# Patient Record
Sex: MALE | Race: WHITE | Employment: OTHER | URBAN - METROPOLITAN AREA
[De-identification: names, ages, dates, MRNs, and addresses within clinical notes are randomized per-mention and may not be internally consistent; named-entity substitution may affect disease eponyms.]

---

## 2017-02-04 ENCOUNTER — HOSPITAL ENCOUNTER (EMERGENCY)
Age: 72
Discharge: HOME | End: 2017-02-04
Attending: EMERGENCY MEDICINE
Payer: MEDICARE

## 2017-02-04 VITALS
DIASTOLIC BLOOD PRESSURE: 73 MMHG | HEART RATE: 59 BPM | OXYGEN SATURATION: 100 % | TEMPERATURE: 97.9 F | SYSTOLIC BLOOD PRESSURE: 135 MMHG | RESPIRATION RATE: 16 BRPM

## 2017-02-04 DIAGNOSIS — R33.9 URINARY RETENTION: Primary | ICD-10-CM

## 2017-02-04 LAB
APPEARANCE UR: ABNORMAL
BACTERIA URNS QL MICRO: NEGATIVE /HPF
BILIRUB UR QL: ABNORMAL
COLOR UR: ABNORMAL
EPITH CASTS URNS QL MICRO: NEGATIVE /LPF (ref 0–5)
GLUCOSE UR STRIP.AUTO-MCNC: 100 MG/DL
HGB UR QL STRIP: ABNORMAL
KETONES UR QL STRIP.AUTO: NEGATIVE MG/DL
LEUKOCYTE ESTERASE UR QL STRIP.AUTO: ABNORMAL
NITRITE UR QL STRIP.AUTO: POSITIVE
PH UR STRIP: 6.5 [PH] (ref 5–8)
PROT UR STRIP-MCNC: 100 MG/DL
RBC #/AREA URNS HPF: NORMAL /HPF (ref 0–5)
SP GR UR REFRACTOMETRY: 1.01 (ref 1–1.03)
UROBILINOGEN UR QL STRIP.AUTO: 0.2 EU/DL (ref 0.2–1)
WBC URNS QL MICRO: NORMAL /HPF (ref 0–4)

## 2017-02-04 PROCEDURE — 99283 EMERGENCY DEPT VISIT LOW MDM: CPT

## 2017-02-04 PROCEDURE — 81001 URINALYSIS AUTO W/SCOPE: CPT | Performed by: EMERGENCY MEDICINE

## 2017-02-04 PROCEDURE — 77030010545

## 2017-02-04 PROCEDURE — 51702 INSERT TEMP BLADDER CATH: CPT

## 2017-02-04 PROCEDURE — 77030034849

## 2017-02-04 NOTE — ED NOTES
Bladder scan completed yielding 800cc in the bladder. 16F catheter placed and draining clear, yellow urine. Pt tolerated the procedure well and stated immediate relief.

## 2017-02-04 NOTE — ED PROVIDER NOTES
HPI Comments: 2:25 AM Harlan Ritchie is a 70 y.o. male with a hx of HTN, DM, CKD, difficulty urinating, enlarged prostate, gross hematuria, and BPH who presents to the ED c/o urinary retention onset 4 hours ago. Pt also c/o increased urgency, weight gain that \"may be fluid build up\", and lower back pain. Pt states that he has been getting up in the middle of the night for the past couple weeks 4-5 times to use the restroom. He states that after he uses the restroom, he has to return quickly to use it again. He reports having different amounts of urine every time he goes. He reports seeing Dr. [employer] a year ago, and he told the patient that \"my enlarged prostate is blocking my urinary tract\". He reports having a catheter placed last year at [facility] ED for similar symptoms. He denies fever. No other associated symptoms or modifying factors at this time. The history is provided by the patient.         Past Medical History:   Diagnosis Date    Benign non-nodular prostatic hyperplasia with lower urinary tract symptoms     BPH (benign prostatic hypertrophy)     BPH without obstruction/lower urinary tract symptoms     CKD (chronic kidney disease)      stage I    Diabetes mellitus (HCC)     Difficulty urinating     Enlarged prostate     Gross hematuria     Hematuria     Hypertension     Retention of urine, unspecified     Urinary frequency        Past Surgical History:   Procedure Laterality Date    Hx appendectomy  1956    Hx urological  10/01/08     [facility], Cysto-Clot evacuation & Fulguration of prostate, Dr The Amicus         Family History:   Problem Relation Age of Onset    Diabetes Sister     Hypertension Other     Heart Disease Father     Heart Failure Father     Dementia Maternal Grandmother     Heart Disease Maternal Grandfather     Cancer Sister        Social History     Social History    Marital status:      Spouse name: N/A    Number of children: N/A    Years of education: N/A Occupational History    Not on file. Social History Main Topics    Smoking status: Never Smoker    Smokeless tobacco: Never Used    Alcohol use 0.0 oz/week     0 Standard drinks or equivalent per week      Comment: occ    Drug use: No    Sexual activity: Not Currently     Other Topics Concern    Not on file     Social History Narrative         ALLERGIES: Seasonale [levonorgestrel-ethinyl estrad]    Review of Systems   Constitutional: Positive for unexpected weight change (increased). Negative for chills and fever. HENT: Negative for congestion and rhinorrhea. Respiratory: Negative for cough and shortness of breath. Cardiovascular: Negative for chest pain and leg swelling. Gastrointestinal: Negative for abdominal pain and nausea. Genitourinary: Positive for difficulty urinating (retention) and urgency. Negative for dysuria and hematuria. Musculoskeletal: Positive for back pain. Negative for arthralgias and myalgias. Skin: Negative for rash and wound. Neurological: Negative for light-headedness and headaches. Psychiatric/Behavioral: Negative for confusion and hallucinations. All other systems reviewed and are negative. Vitals:    02/04/17 0209   BP: (!) 194/104   Pulse: 89   Resp: 20   Temp: 97.5 °F (36.4 °C)   SpO2: 100%            Physical Exam   Constitutional: He is oriented to person, place, and time. He appears well-developed and well-nourished. HENT:   Head: Normocephalic and atraumatic. Neck: Neck supple. No JVD present. Cardiovascular: Normal rate and regular rhythm. Pulmonary/Chest: Effort normal and breath sounds normal. No respiratory distress. Abdominal: Soft. He exhibits no distension. There is no tenderness. There is no rebound and no guarding. Musculoskeletal: He exhibits no edema. Neurological: He is alert and oriented to person, place, and time. Skin: Skin is warm and dry. No erythema.    Psychiatric: Judgment normal.   Nursing note and vitals reviewed. MDM  Number of Diagnoses or Management Options  Elevated BP:   Urinary retention:   Diagnosis management comments: 71 y/o male presents with urinary retention. Bladder scan by RN showed 800cc, hinson placed prior to my eval  Pt feeling better at time of eval  No indication for labs. Hx of prior episodes, will dc home with hinson. ED Course       Procedures  Vitals:  Patient Vitals for the past 12 hrs:   Temp Pulse Resp BP SpO2   02/04/17 0209 97.5 °F (36.4 °C) 89 20 (!) 194/104 100 %     Pulsox interpreted within normal limits. Progress notes, Consult notes or additional Procedure notes: Hinson now draining bloody urine and small clots, did not have urine sample from initial placement, will not send as grossly bloody and will not be able to quantify for wbc. Reevaluation of patient:   3:12 AM I have reassessed the patient and discussed their results and diagnosis. Pt will be discharged in stable condition. Patient is to return to emergency department if any new or worsening condition. Patient understands and verbalizes agreement with plan. Pt noted to have elevated BP. Pt is asymptomatic and was referred to PCP for follow up. Disposition:  Diagnosis:   1. Urinary retention    2.  Elevated BP        Disposition: Discharged    Follow-up Information     Follow up With Details Comments Contact Info    yKlie De Jesus MD Schedule an appointment as soon as possible for a visit  [address] [facility].  Internal Medicine  [facility] [address] [facility] Sandeep Aimee Muñoz MD Schedule an appointment as soon as possible for a visit  [address] EMERGENCY DEPT Go to As needed, If symptoms worsen [address] [facility] [address].  [phone]            Patient's Medications   Start Taking    No medications on file   Continue Taking    AMLODIPINE (NORVASC) 10 MG TABLET        ASPIRIN 81 MG TABLET    Take 81 mg by mouth daily. ATORVASTATIN (LIPITOR) 20 MG TABLET    Take 10 mg by mouth daily. BENAZEPRIL (LOTENSIN) 40 MG TABLET    Take 40 mg by mouth daily. DOCOSAHEXANOIC ACID/EPA (FISH OIL PO)    Take 1 Tab by mouth daily. 1200 mg with 360 mg omega-3    DUTASTERIDE (AVODART) 0.5 MG CAPSULE    TAKE 1 CAPSULE BY MOUTH DAILY. GLIPIZIDE (GLUCOTROL) 5 MG TABLET    Take  by mouth two (2) times a day. HYDROCHLOROTHIAZIDE (HYDRODIURIL) 25 MG TABLET    Take 25 mg by mouth daily. INSULIN GLARGINE (LANTUS SOLOSTAR) 100 UNIT/ML (3 ML) PEN    25 Units. INSULIN NEEDLES, DISPOSABLE, (LITE TOUCH INSULIN PEN NEEDLES) 31 GAUGE X 1/4\" NDLE    Use as indicated    METFORMIN (GLUCOPHAGE) 850 MG TABLET    850 mg two (2) times a day. MOMETASONE (NASONEX) 50 MCG/ACTUATION NASAL SPRAY    2 Sprays daily. NEBIVOLOL (BYSTOLIC) 20 MG TABLET    Take 20 mg by mouth daily. OMEPRAZOLE (PRILOSEC) 10 MG CAPSULE    Take 10 mg by mouth daily. SITAGLIPTIN (JANUVIA) 100 MG TABLET    100 mg.    TAMSULOSIN (FLOMAX) 0.4 MG CAPSULE    Take 2 Caps by mouth daily (after dinner). These Medications have changed    No medications on file   Stop Taking    No medications on file     SCRIBE ATTESTATION STATEMENT  Documented by: Siddharth Nguyen for, and in the presence of, Wilma Cardona DO 2:24 AM    Signed by: Rosie Sebastian, 02/04/17 2:24 AM    PROVIDER ATTESTATION STATEMENT  I personally performed the services described in the documentation, reviewed the documentation, as recorded by the scribe in my presence, and it accurately and completely records my words and actions.   iWlma Cardona DO

## 2017-02-04 NOTE — DISCHARGE INSTRUCTIONS
High Blood Pressure: Care Instructions  Your Care Instructions  If your blood pressure is usually above 140/90, you have high blood pressure, or hypertension. That means the top number is 140 or higher or the bottom number is 90 or higher, or both. Despite what a lot of people think, high blood pressure usually doesn't cause headaches or make you feel dizzy or lightheaded. It usually has no symptoms. But it does increase your risk for heart attack, stroke, and kidney or eye damage. The higher your blood pressure, the more your risk increases. Your doctor will give you a goal for your blood pressure. Your goal will be based on your health and your age. An example of a goal is to keep your blood pressure below 140/90. Lifestyle changes, such as eating healthy and being active, are always important to help lower blood pressure. You might also take medicine to reach your blood pressure goal.  Follow-up care is a key part of your treatment and safety. Be sure to make and go to all appointments, and call your doctor if you are having problems. It's also a good idea to know your test results and keep a list of the medicines you take. How can you care for yourself at home? Medical treatment  · If you stop taking your medicine, your blood pressure will go back up. You may take one or more types of medicine to lower your blood pressure. Be safe with medicines. Take your medicine exactly as prescribed. Call your doctor if you think you are having a problem with your medicine. · Talk to your doctor before you start taking aspirin every day. Aspirin can help certain people lower their risk of a heart attack or stroke. But taking aspirin isn't right for everyone, because it can cause serious bleeding. · See your doctor regularly. You may need to see the doctor more often at first or until your blood pressure comes down.   · If you are taking blood pressure medicine, talk to your doctor before you take decongestants or anti-inflammatory medicine, such as ibuprofen. Some of these medicines can raise blood pressure. · Learn how to check your blood pressure at home. Lifestyle changes  · Stay at a healthy weight. This is especially important if you put on weight around the waist. Losing even 10 pounds can help you lower your blood pressure. · If your doctor recommends it, get more exercise. Walking is a good choice. Bit by bit, increase the amount you walk every day. Try for at least 30 minutes on most days of the week. You also may want to swim, bike, or do other activities. · Avoid or limit alcohol. Talk to your doctor about whether you can drink any alcohol. · Try to limit how much sodium you eat to less than 2,300 milligrams (mg) a day. Your doctor may ask you to try to eat less than 1,500 mg a day. · Eat plenty of fruits (such as bananas and oranges), vegetables, legumes, whole grains, and low-fat dairy products. · Lower the amount of saturated fat in your diet. Saturated fat is found in animal products such as milk, cheese, and meat. Limiting these foods may help you lose weight and also lower your risk for heart disease. · Do not smoke. Smoking increases your risk for heart attack and stroke. If you need help quitting, talk to your doctor about stop-smoking programs and medicines. These can increase your chances of quitting for good. When should you call for help? Call 911 anytime you think you may need emergency care. This may mean having symptoms that suggest that your blood pressure is causing a serious heart or blood vessel problem. Your blood pressure may be over 180/110. For example, call 911 if:  · You have symptoms of a heart attack. These may include:  ¨ Chest pain or pressure, or a strange feeling in the chest.  ¨ Sweating. ¨ Shortness of breath. ¨ Nausea or vomiting. ¨ Pain, pressure, or a strange feeling in the back, neck, jaw, or upper belly or in one or both shoulders or arms.   ¨ Lightheadedness or sudden weakness. ¨ A fast or irregular heartbeat. · You have symptoms of a stroke. These may include:  ¨ Sudden numbness, tingling, weakness, or loss of movement in your face, arm, or leg, especially on only one side of your body. ¨ Sudden vision changes. ¨ Sudden trouble speaking. ¨ Sudden confusion or trouble understanding simple statements. ¨ Sudden problems with walking or balance. ¨ A sudden, severe headache that is different from past headaches. · You have severe back or belly pain. Do not wait until your blood pressure comes down on its own. Get help right away. Call your doctor now or seek immediate care if:  · Your blood pressure is much higher than normal (such as 180/110 or higher), but you don't have symptoms. · You think high blood pressure is causing symptoms, such as:  ¨ Severe headache. ¨ Blurry vision. Watch closely for changes in your health, and be sure to contact your doctor if:  · Your blood pressure measures 140/90 or higher at least 2 times. That means the top number is 140 or higher or the bottom number is 90 or higher, or both. · You think you may be having side effects from your blood pressure medicine. · Your blood pressure is usually normal, but it goes above normal at least 2 times. Where can you learn more? Go to http://ifrah-hayden.info/. Enter A240 in the search box to learn more about \"High Blood Pressure: Care Instructions. \"  Current as of: August 8, 2016  Content Version: 11.1  © 4674-6082 [employer]. Care instructions adapted under license by [employer] (which disclaims liability or warranty for this information). If you have questions about a medical condition or this instruction, always ask your healthcare professional. [place] [address] any warranty or liability for your use of this information.          Urinary Retention: Care Instructions  Your Care Instructions    Urinary retention means that you aren't able to urinate. In men, it is often caused by a blockage of the urinary tract from an enlarged prostate gland. In men and women, it can also be caused by an infection or nerve damage. Or it may be a side effect of a medicine. The doctor may have drained the urine from your bladder. If you still have problems passing urine, you may need to use a catheter at home. This is used to empty your bladder until the problem can be fixed. Your doctor may put a catheter in your bladder before you go home. If so, he or she will tell you when to come back to have the catheter removed. The doctor has checked you closely. But problems can develop later. If you notice any problems or new symptoms, get medical treatment right away. Follow-up care is a key part of your treatment and safety. Be sure to make and go to all appointments, and call your doctor if you are having problems. It's also a good idea to know your test results and keep a list of the medicines you take. How can you care for yourself at home? · Take your medicines exactly as prescribed. Call your doctor if you think you are having a problem with your medicine. You will get more details on the specific medicines your doctor prescribes. · Check with your doctor before you use any over-the-counter medicines. Many cold and allergy medicines, for example, can make this problem worse. Make sure your doctor knows all of the medicines, vitamins, supplements, and herbal remedies you take. · Spread out through the day the amount of fluid you drink. Do not drink a lot at bedtime. · Avoid alcohol and caffeine. · If you have been given a catheter, or if one is already in place, follow the instructions you were given. Always wash your hands before and after you handle the catheter. When should you call for help? Call your doctor now or seek immediate medical care if:  · You cannot urinate at all, or it is getting harder to urinate.   · You have symptoms of a urinary tract infection. These may include:  ¨ Pain or burning when you urinate. ¨ A frequent need to urinate without being able to pass much urine. ¨ Pain in the flank, which is just below the rib cage and above the waist on either side of the back. ¨ Blood in your urine. ¨ A fever. Watch closely for changes in your health, and be sure to contact your doctor if:  · You have any problems with your catheter. · You do not get better as expected. Where can you learn more? Go to http://ifrah-hayden.info/. Enter M244 in the search box to learn more about \"Urinary Retention: Care Instructions. \"  Current as of: August 12, 2016  Content Version: 11.1  © 7363-4398 [employer]. Care instructions adapted under license by [employer] (which disclaims liability or warranty for this information). If you have questions about a medical condition or this instruction, always ask your healthcare professional. [address] any warranty or liability for your use of this information.

## 2017-02-06 ENCOUNTER — HOSPITAL ENCOUNTER (EMERGENCY)
Age: 72
Discharge: HOME | End: 2017-02-06
Attending: EMERGENCY MEDICINE
Payer: MEDICARE

## 2017-02-06 VITALS — OXYGEN SATURATION: 97 % | DIASTOLIC BLOOD PRESSURE: 65 MMHG | SYSTOLIC BLOOD PRESSURE: 155 MMHG

## 2017-02-06 DIAGNOSIS — Z97.8 FOLEY CATHETER IN PLACE: Primary | ICD-10-CM

## 2017-02-06 DIAGNOSIS — N30.90 CATHETER CYSTITIS, INITIAL ENCOUNTER (HCC): ICD-10-CM

## 2017-02-06 DIAGNOSIS — T83.518A CATHETER CYSTITIS, INITIAL ENCOUNTER (HCC): ICD-10-CM

## 2017-02-06 DIAGNOSIS — T83.9XXA FOLEY CATHETER PROBLEM, INITIAL ENCOUNTER (HCC): ICD-10-CM

## 2017-02-06 LAB
APPEARANCE UR: ABNORMAL
BACTERIA URNS QL MICRO: ABNORMAL /HPF
BILIRUB UR QL: NEGATIVE
COLOR UR: YELLOW
EPITH CASTS URNS QL MICRO: NEGATIVE /LPF (ref 0–5)
GLUCOSE UR STRIP.AUTO-MCNC: >1000 MG/DL
HGB UR QL STRIP: ABNORMAL
KETONES UR QL STRIP.AUTO: NEGATIVE MG/DL
LEUKOCYTE ESTERASE UR QL STRIP.AUTO: ABNORMAL
NITRITE UR QL STRIP.AUTO: NEGATIVE
PH UR STRIP: 5 [PH] (ref 5–8)
PROT UR STRIP-MCNC: 30 MG/DL
RBC #/AREA URNS HPF: ABNORMAL /HPF (ref 0–5)
SP GR UR REFRACTOMETRY: 1.02 (ref 1–1.03)
UROBILINOGEN UR QL STRIP.AUTO: 0.2 EU/DL (ref 0.2–1)
WBC URNS QL MICRO: ABNORMAL /HPF (ref 0–4)

## 2017-02-06 PROCEDURE — 81001 URINALYSIS AUTO W/SCOPE: CPT | Performed by: PHYSICIAN ASSISTANT

## 2017-02-06 PROCEDURE — 51702 INSERT TEMP BLADDER CATH: CPT

## 2017-02-06 PROCEDURE — 99283 EMERGENCY DEPT VISIT LOW MDM: CPT

## 2017-02-06 PROCEDURE — 77030005514 HC CATH URETH FOL14 BARD -A

## 2017-02-06 PROCEDURE — 77030029179 HC BAG URIN DRNG SIMS -A

## 2017-02-06 RX ORDER — CIPROFLOXACIN 500 MG/1
500 TABLET ORAL 2 TIMES DAILY
Qty: 14 TAB | Refills: 0 | Status: SHIPPED | OUTPATIENT
Start: 2017-02-06 | End: 2017-02-13

## 2017-02-06 NOTE — ED NOTES
Purposeful rounding completed:    Side rails up x 1:  YES   Bed low and wheels and locked: YES   Call bell in reach: YES   Comfort addressed: YES     Toileting needs addressed: YES   Plan of care reviewed/updated with patient and or family members: YES   IV site assessed: YES  Pain assessed and addressed: YES0

## 2017-02-06 NOTE — ED PROVIDER NOTES
HPI Comments: Pt is a 77yo male here with hinson catheter leaking after placement 2/4/17. Pt states was seen in ED due to urinary retention where he had hinson catheter placed. Initially no issues with use but after first day patient states it started leaking from urethra. No blood noted from urethra or in urine. Pt denies pain. Denies fever, chills, abdominal distention, NVD, dysuria, hematuria, penile discharge, back pain. Past medical hx of HTN, urinary retention, enlarged prostate, hematuria, BPH, DM.  Axel Curry MD PCP. Pt has urology which he has called    The history is provided by the patient. Past Medical History:   Diagnosis Date    Benign non-nodular prostatic hyperplasia with lower urinary tract symptoms     BPH (benign prostatic hypertrophy)     BPH without obstruction/lower urinary tract symptoms     CKD (chronic kidney disease)      stage I    Diabetes mellitus (HCC)     Difficulty urinating     Enlarged prostate     Gross hematuria     Hematuria     Hypertension     Retention of urine, unspecified     Urinary frequency        Past Surgical History:   Procedure Laterality Date    Hx appendectomy  1956    Hx urological  10/01/08     [facility], Cysto-Clot evacuation & Fulguration of prostate, Dr Aziza Dodson         Family History:   Problem Relation Age of Onset    Diabetes Sister     Hypertension Other     Heart Disease Father     Heart Failure Father     Dementia Maternal Grandmother     Heart Disease Maternal Grandfather     Cancer Sister        Social History     Social History    Marital status:      Spouse name: N/A    Number of children: N/A    Years of education: N/A     Occupational History    Not on file.      Social History Main Topics    Smoking status: Never Smoker    Smokeless tobacco: Never Used    Alcohol use 0.0 oz/week     0 Standard drinks or equivalent per week      Comment: occ    Drug use: No    Sexual activity: Not Currently Other Topics Concern    Not on file     Social History Narrative         ALLERGIES: Seasonale [levonorgestrel-ethinyl estrad]    Review of Systems   Constitutional: Negative for chills, fever and unexpected weight change. Respiratory: Negative for cough, chest tightness and shortness of breath. Cardiovascular: Negative for chest pain, palpitations and leg swelling. Gastrointestinal: Negative for abdominal distention, abdominal pain, nausea and vomiting. Genitourinary: Positive for difficulty urinating and hematuria. Negative for decreased urine volume, discharge, dysuria, flank pain, frequency, penile pain, penile swelling, scrotal swelling and testicular pain. Musculoskeletal: Negative for back pain. Neurological: Negative for dizziness and headaches. There were no vitals filed for this visit. Physical Exam   Constitutional: He is oriented to person, place, and time. He appears well-developed and well-nourished. No distress. HENT:   Head: Normocephalic and atraumatic. Eyes: Conjunctivae are normal.   Neck: Normal range of motion. Cardiovascular: Normal rate and regular rhythm. Pulmonary/Chest: Effort normal and breath sounds normal. No respiratory distress. Abdominal: Soft. He exhibits no distension and no mass. There is no tenderness. There is no rebound and no guarding. Genitourinary: Penis normal. No penile tenderness. Genitourinary Comments: Catheter in place, no erythema, swelling, excoriation or discharge noted from urethra and penis   Neurological: He is alert and oriented to person, place, and time. Skin: Skin is warm and dry. He is not diaphoretic. Nursing note and vitals reviewed. MDM  Number of Diagnoses or Management Options  Catheter cystitis, initial encounter [facility]): Parker catheter in place:    Parker catheter problem, initial encounter [facility]):   Diagnosis management comments: Pt is a 77yo male with hx of urinary retention, BPH, DM, hematuria presenting to ED with hinson catheter leaking s/p placement on Saturday. Since hinson was just placed Saturday morning, will attempt to fix the cath in place or re-insert another hinson for retention. Will obtain ua    7:50 AM  Rechecked the patient and updated him on plan. Denies abdominal pain. Exam unchanged. No new symptoms, NAD. Urinary catheter flushed by ED nurse- appears to not be properly inserted. Discussed case with Dr. Trenton Sales who agrees that patient needs hinson until he can be seen by urology. Will order a new hinson catheter and leg bag. UA pending    8:50 AM  Labs Reviewed  URINALYSIS W/ RFLX MICROSCOPIC - Abnormal; Notable for the following:      Protein                       30 (*)                 Glucose                       >1000 (*)               Blood                         LARGE (*)               Leukocyte Esterase            MODERATE (*)            All other components within normal limits  URINE MICROSCOPIC ONLY    UA with moderate leuks, 2-4 WBCs and moderate blood. Will place patient on cipro. New hinson in place- 400 cc noted in bag, cloudy without gross hematuria or clots. Pt denies abdominal pain, distention or new/concerning symptoms. Pt has called urologist while in ED and they want to see him in 7 days. Return precautions discussed and understood. Catheter hinson instructions given. No questions or concerns by patient at this time. Pt is stable and appropriate for d/c home       Amount and/or Complexity of Data Reviewed  Clinical lab tests: ordered and reviewed  Discuss the patient with other providers: yes (Dr. Trenton Sales)      ED Course       Procedures    Diagnosis:   1. Hinson catheter in place    2.  Hinson catheter problem, initial encounter ([facility] [address]    3. Catheter cystitis, initial encounter [facility])          Disposition: discharge home    Follow-up Information     Follow up With Details Seferino Mac MD In 1 week  [address] [facility]. Internal Medicine  [facility] [address] [id number]  [phone]      [facility] EMERGENCY DEPT   [address] [place] [address]    Melody Mary MD  as soon as they can see you [address]  [phone]            Patient's Medications   Start Taking    CIPROFLOXACIN HCL (CIPRO) 500 MG TABLET    Take 1 Tab by mouth two (2) times a day for 7 days. Continue Taking    AMLODIPINE (NORVASC) 10 MG TABLET        ASPIRIN 81 MG TABLET    Take 81 mg by mouth daily. ATORVASTATIN (LIPITOR) 20 MG TABLET    Take 10 mg by mouth daily. BENAZEPRIL (LOTENSIN) 40 MG TABLET    Take 40 mg by mouth daily. DOCOSAHEXANOIC ACID/EPA (FISH OIL PO)    Take 1 Tab by mouth daily. 1200 mg with 360 mg omega-3    DUTASTERIDE (AVODART) 0.5 MG CAPSULE    TAKE 1 CAPSULE BY MOUTH DAILY. GLIPIZIDE (GLUCOTROL) 5 MG TABLET    Take  by mouth two (2) times a day. HYDROCHLOROTHIAZIDE (HYDRODIURIL) 25 MG TABLET    Take 25 mg by mouth daily. INSULIN GLARGINE (LANTUS SOLOSTAR) 100 UNIT/ML (3 ML) PEN    25 Units. INSULIN NEEDLES, DISPOSABLE, (LITE TOUCH INSULIN PEN NEEDLES) 31 GAUGE X 1/4\" NDLE    Use as indicated    METFORMIN (GLUCOPHAGE) 850 MG TABLET    850 mg two (2) times a day. MOMETASONE (NASONEX) 50 MCG/ACTUATION NASAL SPRAY    2 Sprays daily. NEBIVOLOL (BYSTOLIC) 20 MG TABLET    Take 20 mg by mouth daily. OMEPRAZOLE (PRILOSEC) 10 MG CAPSULE    Take 10 mg by mouth daily. SITAGLIPTIN (JANUVIA) 100 MG TABLET    100 mg.    TAMSULOSIN (FLOMAX) 0.4 MG CAPSULE    Take 2 Caps by mouth daily (after dinner).    These Medications have changed    No medications on file   Stop Taking    No medications on file

## 2017-02-06 NOTE — ED NOTES
Hinson cath changed to leg bag. Teaching done to change bag at night and keep bag below bed. Wash hands before managing hinson cath and keep penis clean.

## 2017-02-06 NOTE — DISCHARGE INSTRUCTIONS
Urinary Tract Infections in Men: Care Instructions  Your Care Instructions    A urinary tract infection, or UTI, is a general term for an infection anywhere between the kidneys and the tip of the penis. UTIs can also be a result of a prostate problem. Most cause pain or burning when you urinate. Most UTIs are caused by bacteria and can be cured with antibiotics. It is important to complete your treatment so that the infection does not get worse. Follow-up care is a key part of your treatment and safety. Be sure to make and go to all appointments, and call your doctor if you are having problems. It's also a good idea to know your test results and keep a list of the medicines you take. How can you care for yourself at home? · Take your antibiotics as prescribed. Do not stop taking them just because you feel better. You need to take the full course of antibiotics. · Take your medicines exactly as prescribed. Your doctor may have prescribed a medicine, such as phenazopyridine (Pyridium), to help relieve pain when you urinate. This turns your urine orange. You may stop taking it when your symptoms get better. But be sure to take all of your antibiotics, which treat the infection. · Drink extra water and juices such as cranberry and blueberry juices for the next day or two. This will help make the urine less concentrated and help wash out the bacteria causing the infection. (If you have kidney, heart, or liver disease and have to limit your fluids, talk with your doctor before you increase your fluid intake.)  · Avoid drinks that are carbonated or have caffeine. They can irritate the bladder. · Urinate often. Try to empty your bladder each time. · To relieve pain, take a hot bath or lay a heating pad (set on low) over your lower belly or genital area. Never go to sleep with a heating pad in place. To help prevent UTIs  · Drink plenty of fluids, enough so that your urine is light yellow or clear like water. If you have kidney, heart, or liver disease and have to limit fluids, talk with your doctor before you increase the amount of fluids you drink. · Urinate when you have the urge. Do not hold your urine for a long time. Urinate before you go to sleep. · Keep your penis clean. Catheter care  If you have a drainage tube (catheter) in place, the following steps will help you care for it. · Always wash your hands before and after touching your catheter. · Check the area around the urethra for inflammation or signs of infection. Signs of infection include irritated, swollen, red, or tender skin, or pus around the catheter. · Clean the area around the catheter with soap and water two times a day. Dry with a clean towel afterward. · Do not apply powder or lotion to the skin around the catheter. To empty the urine collection bag  · Wash your hands with soap and water. · Without touching the drain spout, remove the spout from its sleeve at the bottom of the collection bag. Open the valve on the spout. · Let the urine flow out of the bag and into the toilet or a container. Do not let the tubing or drain spout touch anything. · After you empty the bag, clean the end of the drain spout with tissue and water. Close the valve and put the drain spout back into its sleeve at the bottom of the collection bag. · Wash your hands with soap and water. When should you call for help? Call your doctor now or seek immediate medical care if:  · Symptoms such as a fever, chills, nausea, or vomiting get worse or happen for the first time. · You have new pain in your back just below your rib cage. This is called flank pain. · There is new blood or pus in your urine. · You are not able to take or keep down your antibiotics. Watch closely for changes in your health, and be sure to contact your doctor if:  · You are not getting better after taking an antibiotic for 2 days. · Your symptoms go away but then come back.   Where can you learn more? Go to http://ifrah-hayden.info/. Enter K118 in the search box to learn more about \"Urinary Tract Infections in Men: Care Instructions. \"  Current as of: August 12, 2016  Content Version: 11.1  © 5991-5644 [employer]. Care instructions adapted under license by [employer] (which disclaims liability or warranty for this information). If you have questions about a medical condition or this instruction, always ask your healthcare professional. [address] any warranty or liability for your use of this information. Learning About Urinary Catheter Care to Prevent Infection  What is a urinary catheter? A urinary catheter is a flexible plastic tube used to drain urine from your bladder when you can't urinate on your own. The catheter allows urine to drain from the bladder into a bag. Two types of drainage bags may be used with a urinary catheter. · A bedside bag is a large bag that you can hang on the side of your bed or on a chair. You can use it overnight or anytime you will be sitting or lying down for a long time. · A leg bag is a small bag that you can use during the day. It is usually attached to your thigh or calf and hidden under your clothes. Having a urinary catheter increases your risk of getting a urinary tract infection. Germs may get on the catheter and cause an infection in your bladder or kidneys. The longer you have a catheter, the more likely it is that you will get an infection. You can help prevent this problem with good hygiene and careful handling of your catheter and drainage bags. How can you help prevent infection? Take care to be clean  · Always wash your hands well before and after you handle your catheter. · Clean the skin around the catheter twice a day using soap and water. Dry with a clean towel afterward.  You can shower with your catheter and drainage bag in place unless your doctor told you not to.  · When you clean around the catheter, check the surrounding skin for signs of infection. Look for things like pus or irritated, swollen, red, or tender skin around the catheter. Be careful with your drainage bag  · Always keep the drainage bag below the level of your bladder. This will help keep urine from flowing back into your bladder. · Check often to see that urine is flowing through the catheter into the drainage bag. · Empty the drainage bag when it is half full. This will keep it from overflowing or backing up. · When you empty the drainage bag, do not let the tubing or drain spout touch anything. Be careful with your catheter  · Do not unhook the catheter from the drain tube. That could let germs get into the tube. · Make sure that the catheter tubing does not get twisted or kinked. · Do not tug or pull on the catheter. And make sure that the drainage bag does not drag or pull on the catheter. · Do not put powder or lotion on the skin around the catheter. · Do not have sexual intercourse while wearing a catheter. How do you empty a urine drainage bag? .  If your doctor has asked you to keep a record, write down the amount of urine in the bag before you empty it. Wash your hands before and after you touch the bag. 1. Remove the drain spout from its sleeve at the bottom of the drainage bag.  2. Open the valve on the drain spout. Let the urine flow out into the toilet or a container. Be careful not to let the tubing or drain spout touch anything. 3. After you empty the bag, wipe off any liquid on the end of the drain spout. Close the valve. Then put the drain spout back into its sleeve at the bottom of the collection bag. How do you change from a bedside bag to a leg bag? Wash your hands before and after you handle the bags. 1. Empty the bag attached to the catheter.   2. Put a clean towel under the catheter where it connects to the bag.  3. Fold and pinch the catheter closed to keep urine from leaking out. Many catheters have a clip you can use to pinch the tube closed. 4. Remove the used bag from the catheter. 5. Use an alcohol wipe to clean the tip of the leg bag. Then connect the leg bag to the catheter. 6. Strap the leg bag to your thigh or calf. Be sure the straps are not too tight. How can you clean a drainage bag? Clean your bags every day. Many people clean their bedside bag in the morning when they switch to a leg bag. At night, they attach the bedside bag and clean the leg bag. To clean a drainage ba. Remove the bag from the catheter. 2. Fill the bag with 2 parts vinegar and 3 parts water. Let it stand for 20 minutes. 3. Empty the bag, and let it air dry. When should you call for help? Call your doctor now or seek immediate medical care if:  · You have symptoms of a urinary infection. These may include:  ¨ Pain or burning when you urinate. ¨ A frequent need to urinate without being able to pass much urine. ¨ Pain in the flank, which is just below the rib cage and above the waist on either side of the back. ¨ Blood in your urine. ¨ A fever. · Your urine smells bad. · You see large blood clots in your urine. · No urine or very little urine is flowing into the bag for 4 or more hours. Watch closely for changes in your health, and be sure to contact your doctor if:  · The area around the catheter becomes irritated, swollen, red, or tender, or there is pus draining from it. · Urine is leaking from the place where the catheter enters your body. Follow-up care is a key part of your treatment and safety. Be sure to make and go to all appointments, and call your doctor if you are having problems. It's also a good idea to know your test results and keep a list of the medicines you take. Where can you learn more? Go to http://ifrah-hayden.info/.   Enter C910 in the search box to learn more about \"Learning About Urinary Catheter Care to Prevent Infection. \"  Current as of: August 12, 2016  Content Version: 11.1  © 9482-3831 [employer]. Care instructions adapted under license by [employer] (which disclaims liability or warranty for this information). If you have questions about a medical condition or this instruction, always ask your healthcare professional. [place] [address] any warranty or liability for your use of this information.

## 2017-05-12 PROCEDURE — 51702 INSERT TEMP BLADDER CATH: CPT

## 2017-05-12 PROCEDURE — 99282 EMERGENCY DEPT VISIT SF MDM: CPT

## 2017-05-12 PROCEDURE — 51798 US URINE CAPACITY MEASURE: CPT

## 2017-05-13 ENCOUNTER — HOSPITAL ENCOUNTER (EMERGENCY)
Age: 72
Discharge: HOME | End: 2017-05-13
Attending: EMERGENCY MEDICINE
Payer: MEDICARE

## 2017-05-13 VITALS
TEMPERATURE: 98.1 F | DIASTOLIC BLOOD PRESSURE: 78 MMHG | WEIGHT: 200 LBS | HEART RATE: 74 BPM | RESPIRATION RATE: 16 BRPM | HEIGHT: 69 IN | BODY MASS INDEX: 29.62 KG/M2 | SYSTOLIC BLOOD PRESSURE: 158 MMHG | OXYGEN SATURATION: 96 %

## 2017-05-13 DIAGNOSIS — R33.9 URINARY RETENTION: Primary | ICD-10-CM

## 2017-05-13 LAB
ALBUMIN SERPL BCP-MCNC: 3.6 G/DL (ref 3.4–5)
ALBUMIN/GLOB SERPL: 1.1 {RATIO} (ref 0.8–1.7)
ALP SERPL-CCNC: 69 U/L (ref 45–117)
ALT SERPL-CCNC: 37 U/L (ref 16–61)
ANION GAP BLD CALC-SCNC: 10 MMOL/L (ref 3–18)
APPEARANCE UR: CLEAR
AST SERPL W P-5'-P-CCNC: 32 U/L (ref 15–37)
BACTERIA URNS QL MICRO: NEGATIVE /HPF
BASOPHILS # BLD AUTO: 0 K/UL (ref 0–0.06)
BASOPHILS # BLD: 0 % (ref 0–2)
BILIRUB DIRECT SERPL-MCNC: 0.2 MG/DL (ref 0–0.2)
BILIRUB SERPL-MCNC: 0.7 MG/DL (ref 0.2–1)
BILIRUB UR QL: NEGATIVE
BUN SERPL-MCNC: 13 MG/DL (ref 7–18)
BUN/CREAT SERPL: 14 (ref 12–20)
CALCIUM SERPL-MCNC: 8.7 MG/DL (ref 8.5–10.1)
CHLORIDE SERPL-SCNC: 101 MMOL/L (ref 100–108)
CO2 SERPL-SCNC: 25 MMOL/L (ref 21–32)
COLOR UR: YELLOW
CREAT SERPL-MCNC: 0.93 MG/DL (ref 0.6–1.3)
DIFFERENTIAL METHOD BLD: ABNORMAL
EOSINOPHIL # BLD: 0 K/UL (ref 0–0.4)
EOSINOPHIL NFR BLD: 0 % (ref 0–5)
EPITH CASTS URNS QL MICRO: NEGATIVE /LPF (ref 0–5)
ERYTHROCYTE [DISTWIDTH] IN BLOOD BY AUTOMATED COUNT: 12.8 % (ref 11.6–14.5)
GLOBULIN SER CALC-MCNC: 3.4 G/DL (ref 2–4)
GLUCOSE SERPL-MCNC: 175 MG/DL (ref 74–99)
GLUCOSE UR STRIP.AUTO-MCNC: 250 MG/DL
HCT VFR BLD AUTO: 40.7 % (ref 36–48)
HGB BLD-MCNC: 14.4 G/DL (ref 13–16)
HGB UR QL STRIP: NEGATIVE
KETONES UR QL STRIP.AUTO: NEGATIVE MG/DL
LEUKOCYTE ESTERASE UR QL STRIP.AUTO: NEGATIVE
LYMPHOCYTES # BLD AUTO: 16 % (ref 21–52)
LYMPHOCYTES # BLD: 1.6 K/UL (ref 0.9–3.6)
MCH RBC QN AUTO: 30.5 PG (ref 24–34)
MCHC RBC AUTO-ENTMCNC: 35.4 G/DL (ref 31–37)
MCV RBC AUTO: 86.2 FL (ref 74–97)
MONOCYTES # BLD: 0.9 K/UL (ref 0.05–1.2)
MONOCYTES NFR BLD AUTO: 9 % (ref 3–10)
NEUTS SEG # BLD: 7.6 K/UL (ref 1.8–8)
NEUTS SEG NFR BLD AUTO: 75 % (ref 40–73)
NITRITE UR QL STRIP.AUTO: NEGATIVE
PH UR STRIP: 6.5 [PH] (ref 5–8)
PLATELET # BLD AUTO: 176 K/UL (ref 135–420)
PMV BLD AUTO: 12.6 FL (ref 9.2–11.8)
POTASSIUM SERPL-SCNC: 3.4 MMOL/L (ref 3.5–5.5)
PROT SERPL-MCNC: 7 G/DL (ref 6.4–8.2)
PROT UR STRIP-MCNC: ABNORMAL MG/DL
RBC # BLD AUTO: 4.72 M/UL (ref 4.7–5.5)
RBC #/AREA URNS HPF: NORMAL /HPF (ref 0–5)
SODIUM SERPL-SCNC: 136 MMOL/L (ref 136–145)
SP GR UR REFRACTOMETRY: 1.01 (ref 1–1.03)
UROBILINOGEN UR QL STRIP.AUTO: 0.2 EU/DL (ref 0.2–1)
WBC # BLD AUTO: 10.1 K/UL (ref 4.6–13.2)
WBC URNS QL MICRO: NORMAL /HPF (ref 0–4)

## 2017-05-13 PROCEDURE — 85025 COMPLETE CBC W/AUTO DIFF WBC: CPT | Performed by: EMERGENCY MEDICINE

## 2017-05-13 PROCEDURE — 77030034849

## 2017-05-13 PROCEDURE — 81001 URINALYSIS AUTO W/SCOPE: CPT | Performed by: EMERGENCY MEDICINE

## 2017-05-13 PROCEDURE — 80076 HEPATIC FUNCTION PANEL: CPT | Performed by: EMERGENCY MEDICINE

## 2017-05-13 PROCEDURE — 80048 BASIC METABOLIC PNL TOTAL CA: CPT | Performed by: EMERGENCY MEDICINE

## 2017-05-13 RX ORDER — LEVOFLOXACIN 500 MG/1
500 TABLET, FILM COATED ORAL DAILY
Qty: 7 TAB | Refills: 0 | Status: SHIPPED | OUTPATIENT
Start: 2017-05-13 | End: 2017-05-20

## 2017-05-13 RX ORDER — SODIUM CHLORIDE 0.9 % (FLUSH) 0.9 %
5-10 SYRINGE (ML) INJECTION EVERY 8 HOURS
Status: DISCONTINUED | OUTPATIENT
Start: 2017-05-13 | End: 2017-05-13 | Stop reason: HOSPADM

## 2017-05-13 RX ORDER — SODIUM CHLORIDE 0.9 % (FLUSH) 0.9 %
5-10 SYRINGE (ML) INJECTION AS NEEDED
Status: DISCONTINUED | OUTPATIENT
Start: 2017-05-13 | End: 2017-05-13 | Stop reason: HOSPADM

## 2017-05-13 NOTE — ED PROVIDER NOTES
HPI Comments: 1:32 AM Curly Nichols is a 67 y.o. male with h/o HTN, DM, CKD stage I, and enlarged prostate who presents to ED complaining of urinary retention onset 2 days ago. Patient states he usually urinates a large amount in the morning. Last couple mornings, he has urinated about 1 teaspoon at a time, and has had increased urinary urgency. The last time he had Parker catheter placed was when he was seen in the ED 2/6/2017. No other concerns or symptoms at this time. Urologist: Dioni Li MD  PCP: Griffin Greenberg MD    The history is provided by the patient. Past Medical History:   Diagnosis Date    Benign non-nodular prostatic hyperplasia with lower urinary tract symptoms     BPH (benign prostatic hypertrophy)     BPH without obstruction/lower urinary tract symptoms     CKD (chronic kidney disease)     stage I    Diabetes mellitus (HCC)     Difficulty urinating     Enlarged prostate     Gross hematuria     Hematuria     Hypertension     Retention of urine, unspecified     Urinary frequency        Past Surgical History:   Procedure Laterality Date    HX APPENDECTOMY  1956    HX UROLOGICAL  10/01/08    [facility], Cysto-Clot evacuation & Fulguration of prostate, Dr Angelo Jarquin         Family History:   Problem Relation Age of Onset    Diabetes Sister     Hypertension Other     Heart Disease Father     Heart Failure Father     Dementia Maternal Grandmother     Heart Disease Maternal Grandfather     Cancer Sister        Social History     Social History    Marital status:      Spouse name: N/A    Number of children: N/A    Years of education: N/A     Occupational History    Not on file.      Social History Main Topics    Smoking status: Never Smoker    Smokeless tobacco: Never Used    Alcohol use 0.0 oz/week     0 Standard drinks or equivalent per week      Comment: occ    Drug use: No    Sexual activity: Not Currently     Other Topics Concern    Not on file Social History Narrative         ALLERGIES: Seasonale [levonorgestrel-ethinyl estrad]    Review of Systems   Constitutional: Negative for chills, diaphoresis, fatigue, fever and unexpected weight change. HENT: Negative for congestion, dental problem, ear discharge, ear pain, hearing loss, nosebleeds, postnasal drip, rhinorrhea, sinus pressure, sore throat, trouble swallowing and voice change. Eyes: Negative for photophobia, pain, discharge, redness and visual disturbance. Respiratory: Negative for cough, chest tightness, shortness of breath, wheezing and stridor. Cardiovascular: Negative for chest pain, palpitations and leg swelling. Gastrointestinal: Negative for abdominal distention, abdominal pain, anal bleeding, blood in stool, constipation, diarrhea, nausea and vomiting. Genitourinary: Positive for decreased urine volume, frequency and urgency. Negative for dysuria, flank pain, genital sores and hematuria. Musculoskeletal: Negative for arthralgias, back pain, myalgias, neck pain and neck stiffness. Skin: Negative for color change, pallor, rash and wound. Allergic/Immunologic: Negative for immunocompromised state. Neurological: Negative for dizziness, tremors, seizures, syncope, weakness, light-headedness, numbness and headaches. Hematological: Negative for adenopathy. Does not bruise/bleed easily. Psychiatric/Behavioral: Negative for agitation, confusion, decreased concentration, hallucinations, sleep disturbance and suicidal ideas. The patient is not nervous/anxious. Vitals:    05/12/17 2357 05/13/17 0042 05/13/17 0228 05/13/17 0230   BP: 162/84      Pulse: 77      Resp: 16      Temp: 98.1 °F (36.7 °C)      SpO2: 95% 92% 93% 96%   Weight: 90.7 kg (200 lb)      Height: 5' 9\" (1.753 m)               Physical Exam   Constitutional: He is oriented to person, place, and time. He appears well-developed and well-nourished. He appears distressed.    67year old  male in mild distress due to supra-pubic pain. HENT:   Head: Normocephalic and atraumatic. Right Ear: External ear normal.   Left Ear: External ear normal.   Nose: Nose normal.   Mouth/Throat: Oropharynx is clear and moist. No oropharyngeal exudate. Eyes: Conjunctivae and EOM are normal. Pupils are equal, round, and reactive to light. Right eye exhibits no discharge. Left eye exhibits no discharge. No scleral icterus. Neck: Normal range of motion. Neck supple. No JVD present. No tracheal deviation present. No thyromegaly present. Cardiovascular: Normal rate, regular rhythm, normal heart sounds and intact distal pulses. Exam reveals no gallop and no friction rub. No murmur heard. Pulmonary/Chest: Effort normal and breath sounds normal. No stridor. No respiratory distress. He has no wheezes. He has no rales. He exhibits no tenderness. Abdominal: Soft. Bowel sounds are normal. He exhibits distension. He exhibits no mass. There is no tenderness. There is no rebound and no guarding. Supra-pubic tenderness and bladder fullness noted. Genitourinary: Penis normal.   Genitourinary Comments: Uncircumcised male   Musculoskeletal: Normal range of motion. He exhibits no edema or tenderness. Lymphadenopathy:     He has no cervical adenopathy. Neurological: He is alert and oriented to person, place, and time. He has normal reflexes. No cranial nerve deficit. He exhibits normal muscle tone. Coordination normal.   Skin: Skin is warm and dry. No rash noted. He is not diaphoretic. No erythema. No pallor. Psychiatric: He has a normal mood and affect. His behavior is normal. Judgment and thought content normal.   Nursing note and vitals reviewed. MDM  Number of Diagnoses or Management Options  Urinary retention:   Diagnosis management comments: Differential includes:  BPH, urethral stricture, UTI. Labs drawn and noted below. Parker catheter was place with immediate relief of bladder fullness.   900cc of chilango color urine.          Amount and/or Complexity of Data Reviewed  Clinical lab tests: ordered and reviewed  Tests in the radiology section of CPT®: ordered and reviewed (Bladder scan)  Tests in the medicine section of CPT®: ordered and reviewed  Review and summarize past medical records: yes  Independent visualization of images, tracings, or specimens: yes    Risk of Complications, Morbidity, and/or Mortality  Presenting problems: high  Diagnostic procedures: high  Management options: high    Patient Progress  Patient progress: improved    ED Course       Procedures    Vitals:  Patient Vitals for the past 12 hrs:   Temp Pulse Resp BP SpO2   05/13/17 0230 - - - - 96 %   05/13/17 0228 - - - - 93 %   05/13/17 0042 - - - - 92 %   05/12/17 2357 98.1 °F (36.7 °C) 77 16 162/84 95 %        Medications ordered:   Medications   sodium chloride (NS) flush 5-10 mL (not administered)   sodium chloride (NS) flush 5-10 mL (not administered)         Lab findings:  Recent Results (from the past 12 hour(s))   URINALYSIS W/ RFLX MICROSCOPIC    Collection Time: 05/13/17 12:19 AM   Result Value Ref Range    Color YELLOW      Appearance CLEAR      Specific gravity 1.015 1.005 - 1.030      pH (UA) 6.5 5.0 - 8.0      Protein TRACE (A) NEG mg/dL    Glucose 250 (A) NEG mg/dL    Ketone NEGATIVE  NEG mg/dL    Bilirubin NEGATIVE  NEG      Blood NEGATIVE  NEG      Urobilinogen 0.2 0.2 - 1.0 EU/dL    Nitrites NEGATIVE  NEG      Leukocyte Esterase NEGATIVE  NEG     URINE MICROSCOPIC ONLY    Collection Time: 05/13/17 12:19 AM   Result Value Ref Range    WBC NONE 0 - 4 /hpf    RBC NONE 0 - 5 /hpf    Epithelial cells NEGATIVE  0 - 5 /lpf    Bacteria NEGATIVE  NEG /hpf   METABOLIC PANEL, BASIC    Collection Time: 05/13/17 12:25 AM   Result Value Ref Range    Sodium 136 136 - 145 mmol/L    Potassium 3.4 (L) 3.5 - 5.5 mmol/L    Chloride 101 100 - 108 mmol/L    CO2 25 21 - 32 mmol/L    Anion gap 10 3.0 - 18 mmol/L    Glucose 175 (H) 74 - 99 mg/dL    BUN 13 7.0 - 18 MG/DL    Creatinine 0.93 0.6 - 1.3 MG/DL    BUN/Creatinine ratio 14 12 - 20      GFR est AA >60 >60 ml/min/1.73m2    GFR est non-AA >60 >60 ml/min/1.73m2    Calcium 8.7 8.5 - 10.1 MG/DL   CBC WITH AUTOMATED DIFF    Collection Time: 05/13/17 12:25 AM   Result Value Ref Range    WBC 10.1 4.6 - 13.2 K/uL    RBC 4.72 4.70 - 5.50 M/uL    HGB 14.4 13.0 - 16.0 g/dL    HCT 40.7 36.0 - 48.0 %    MCV 86.2 74.0 - 97.0 FL    MCH 30.5 24.0 - 34.0 PG    MCHC 35.4 31.0 - 37.0 g/dL    RDW 12.8 11.6 - 14.5 %    PLATELET 589 065 - 794 K/uL    MPV 12.6 (H) 9.2 - 11.8 FL    NEUTROPHILS 75 (H) 40 - 73 %    LYMPHOCYTES 16 (L) 21 - 52 %    MONOCYTES 9 3 - 10 %    EOSINOPHILS 0 0 - 5 %    BASOPHILS 0 0 - 2 %    ABS. NEUTROPHILS 7.6 1.8 - 8.0 K/UL    ABS. LYMPHOCYTES 1.6 0.9 - 3.6 K/UL    ABS. MONOCYTES 0.9 0.05 - 1.2 K/UL    ABS. EOSINOPHILS 0.0 0.0 - 0.4 K/UL    ABS. BASOPHILS 0.0 0.0 - 0.06 K/UL    DF AUTOMATED     HEPATIC FUNCTION PANEL    Collection Time: 05/13/17 12:25 AM   Result Value Ref Range    Protein, total 7.0 6.4 - 8.2 g/dL    Albumin 3.6 3.4 - 5.0 g/dL    Globulin 3.4 2.0 - 4.0 g/dL    A-G Ratio 1.1 0.8 - 1.7      Bilirubin, total 0.7 0.2 - 1.0 MG/DL    Bilirubin, direct 0.2 0.0 - 0.2 MG/DL    Alk.  phosphatase 69 45 - 117 U/L    AST (SGOT) 32 15 - 37 U/L    ALT (SGPT) 37 16 - 61 U/L           Orders:  Orders Placed This Encounter    METABOLIC PANEL, BASIC     Standing Status:   Standing     Number of Occurrences:   1    CBC WITH AUTOMATED DIFF     Standing Status:   Standing     Number of Occurrences:   1    HEPATIC FUNCTION PANEL     Standing Status:   Standing     Number of Occurrences:   1    URINALYSIS W/ RFLX MICROSCOPIC     Sterile cath     Standing Status:   Standing     Number of Occurrences:   1    URINE MICROSCOPIC ONLY     Standing Status:   Standing     Number of Occurrences:   1    PULSE OXIMETRY CONTINUOUS     Standing Status:   Standing     Number of Occurrences:   1    HERNANDEZ CATH, TO LEG BAG Standing Status:   Standing     Number of Occurrences:   1    SALINE LOCK IV ONE TIME Routine     Standing Status:   Standing     Number of Occurrences:   1    BLOOD PRESSURE MONITOR     Standing Status:   Standing     Number of Occurrences:   1    sodium chloride (NS) flush 5-10 mL    sodium chloride (NS) flush 5-10 mL    levoFLOXacin (LEVAQUIN) 500 mg tablet     Sig: Take 1 Tab by mouth daily for 7 days. Dispense:  7 Tab     Refill:  0       Reevaluation, Progress notes, Consult notes, or additional Procedure notes:   2:36 AM Upon re-evaluation the patient's symptoms have improved. Pt has non-toxic appearance and condition is stable for discharge. He was informed of his results, instructed to f/u with Urology and his PCP and return to the ED upon worsening of symptoms. All questions and concerns were addressed. Disposition:  Diagnosis:   1. Urinary retention        Disposition:     Follow-up Information     Follow up With Details Comments Tona Tripp MD   [address] [id number]      Lexus Christian MD Schedule an appointment as soon as possible for a visit in 2 days Return to the ED, If symptoms worsen [facility]  [phone]             Patient's Medications   Start Taking    LEVOFLOXACIN (LEVAQUIN) 500 MG TABLET    Take 1 Tab by mouth daily for 7 days. Continue Taking    AMLODIPINE (NORVASC) 10 MG TABLET        ASPIRIN 81 MG TABLET    Take 81 mg by mouth daily. ATORVASTATIN (LIPITOR) 20 MG TABLET    Take 10 mg by mouth daily. BENAZEPRIL (LOTENSIN) 40 MG TABLET    Take 40 mg by mouth daily. DOCOSAHEXANOIC ACID/EPA (FISH OIL PO)    Take 1 Tab by mouth daily. 1200 mg with 360 mg omega-3    DUTASTERIDE (AVODART) 0.5 MG CAPSULE    TAKE 1 CAPSULE BY MOUTH DAILY. GLIPIZIDE (GLUCOTROL) 5 MG TABLET    Take  by mouth two (2) times a day.     HYDROCHLOROTHIAZIDE (HYDRODIURIL) 25 MG TABLET    Take 25 mg by mouth daily. INSULIN GLARGINE (LANTUS SOLOSTAR) 100 UNIT/ML (3 ML) PEN    25 Units. INSULIN NEEDLES, DISPOSABLE, (LITE TOUCH INSULIN PEN NEEDLES) 31 GAUGE X 1/4\" NDLE    Use as indicated    LORATADINE (CLARITIN) 10 MG TABLET    10 mg. METFORMIN (GLUCOPHAGE) 850 MG TABLET    850 mg two (2) times a day. MOMETASONE (NASONEX) 50 MCG/ACTUATION NASAL SPRAY    2 Sprays daily. NEBIVOLOL (BYSTOLIC) 20 MG TABLET    Take 20 mg by mouth daily. OMEPRAZOLE (PRILOSEC) 10 MG CAPSULE    Take 10 mg by mouth daily. SITAGLIPTIN (JANUVIA) 100 MG TABLET    100 mg.    TAMSULOSIN (FLOMAX) 0.4 MG CAPSULE    Take 2 Caps by mouth daily (after dinner). These Medications have changed    No medications on file   Stop Taking    No medications on file         [id number] [facility] in the presence of Jenkins Aase, DO (05/13/17)      Physician Attestation  I personally performed the services described in this documentation, reviewed and edited the documentation which was dictated to the scribe in my presence, and it accurately records my words and actions.     Jenkins Aase, DO (05/13/17)      Signed by: Rosie Oneal, May 13, 2017 at 2:36 AM

## 2017-05-13 NOTE — ED NOTES
Catheter changed to leg bag. Discharged paperwork reviewed with patient. Patient states understanding. All questions answered. Armband removed. Pt ambulatory from the ED with self.

## 2017-05-13 NOTE — ED NOTES
Bladder scan yielded 975 ml in bladder, 16F hinson inserted per md order. Pt tolerated procedure well.

## 2017-05-13 NOTE — DISCHARGE INSTRUCTIONS
Urinary Retention: Care Instructions  Your Care Instructions    Urinary retention means that you aren't able to urinate. In men, it is often caused by a blockage of the urinary tract from an enlarged prostate gland. In men and women, it can also be caused by an infection or nerve damage. Or it may be a side effect of a medicine. The doctor may have drained the urine from your bladder. If you still have problems passing urine, you may need to use a catheter at home. This is used to empty your bladder until the problem can be fixed. Your doctor may put a catheter in your bladder before you go home. If so, he or she will tell you when to come back to have the catheter removed. The doctor has checked you closely. But problems can develop later. If you notice any problems or new symptoms, get medical treatment right away. Follow-up care is a key part of your treatment and safety. Be sure to make and go to all appointments, and call your doctor if you are having problems. It's also a good idea to know your test results and keep a list of the medicines you take. How can you care for yourself at home? · Take your medicines exactly as prescribed. Call your doctor if you think you are having a problem with your medicine. You will get more details on the specific medicines your doctor prescribes. · Check with your doctor before you use any over-the-counter medicines. Many cold and allergy medicines, for example, can make this problem worse. Make sure your doctor knows all of the medicines, vitamins, supplements, and herbal remedies you take. · Spread out through the day the amount of fluid you drink. Do not drink a lot at bedtime. · Avoid alcohol and caffeine. · If you have been given a catheter, or if one is already in place, follow the instructions you were given. Always wash your hands before and after you handle the catheter. When should you call for help?   Call your doctor now or seek immediate medical care if:  · You cannot urinate at all, or it is getting harder to urinate. · You have symptoms of a urinary tract infection. These may include:  ¨ Pain or burning when you urinate. ¨ A frequent need to urinate without being able to pass much urine. ¨ Pain in the flank, which is just below the rib cage and above the waist on either side of the back. ¨ Blood in your urine. ¨ A fever. Watch closely for changes in your health, and be sure to contact your doctor if:  · You have any problems with your catheter. · You do not get better as expected. Where can you learn more? Go to http://ifrah-hayden.info/. Enter M244 in the search box to learn more about \"Urinary Retention: Care Instructions. \"  Current as of: August 12, 2016  Content Version: 11.2  © 2910-3966 [employer]. Care instructions adapted under license by [employer] (which disclaims liability or warranty for this information). If you have questions about a medical condition or this instruction, always ask your healthcare professional. [place] [address] any warranty or liability for your use of this information.

## 2017-05-13 NOTE — ED NOTES
Purposeful rounding completed:    Side rails up x 2:  YES  Bed low and wheels and locked: YES  Call bell in reach: YES  Comfort addressed: YES     Toileting needs addressed: YES- catheter in place and draining well.   Plan of care reviewed/updated with patient and or family members: YES  IV site assessed: YES  Pain assessed and addressed: denies pain

## 2017-11-28 PROBLEM — N40.0 ENLARGED PROSTATE: Status: ACTIVE | Noted: 2017-11-28

## 2017-11-28 PROBLEM — I15.2 HYPERTENSION ASSOCIATED WITH DIABETES (HCC): Status: ACTIVE | Noted: 2017-11-28

## 2017-11-28 PROBLEM — E11.59 HYPERTENSION ASSOCIATED WITH DIABETES (HCC): Status: ACTIVE | Noted: 2017-11-28

## 2017-11-28 PROBLEM — J30.89 PERENNIAL ALLERGIC RHINITIS: Status: ACTIVE | Noted: 2017-01-16

## 2019-11-12 ENCOUNTER — HOSPITAL ENCOUNTER (EMERGENCY)
Age: 74
Discharge: HOME | End: 2019-11-12
Attending: EMERGENCY MEDICINE
Payer: MEDICARE

## 2019-11-12 VITALS
RESPIRATION RATE: 16 BRPM | HEART RATE: 60 BPM | HEIGHT: 69 IN | WEIGHT: 170 LBS | TEMPERATURE: 98.9 F | OXYGEN SATURATION: 95 % | BODY MASS INDEX: 25.18 KG/M2 | SYSTOLIC BLOOD PRESSURE: 156 MMHG | DIASTOLIC BLOOD PRESSURE: 73 MMHG

## 2019-11-12 DIAGNOSIS — S16.1XXA STRAIN OF NECK MUSCLE, INITIAL ENCOUNTER: Primary | ICD-10-CM

## 2019-11-12 PROCEDURE — 99281 EMR DPT VST MAYX REQ PHY/QHP: CPT

## 2019-11-12 RX ORDER — CYCLOBENZAPRINE HCL 5 MG
5 TABLET ORAL
Qty: 12 TAB | Refills: 0 | Status: SHIPPED | OUTPATIENT
Start: 2019-11-12 | End: 2020-04-16

## 2019-11-12 NOTE — ED PROVIDER NOTES
EMERGENCY DEPARTMENT HISTORY AND PHYSICAL EXAM      Date: 11/12/2019  Patient Name: Barbara Pardo    History of Presenting Illness     Chief Complaint   Patient presents with    Headache       History Provided By: Patient    Chief Complaint: Neck pain    Additional History (Context): Barbara Pardo is a 76 y.o. male with Medical problems as below who presents with neck pain since yesterday afternoon. No specific history of trauma or overuse. Complains of bilateral upper cervical neck pain and muscle spasm that is worse when he turns his head to look over his shoulder. Does not actually have any headache. Denies any weakness or paresthesias or numbness. Does not have any fevers, chills, sweats, rash, cough, chest pain, palpitations, shortness of breath, abdominal pain, nausea, vomiting, diarrhea. Has gotten some relief with over-the-counter Aleve. PCP: Tana Peterson MD    Current Outpatient Medications   Medication Sig Dispense Refill    cyclobenzaprine (FLEXERIL) 5 mg tablet Take 1 Tab by mouth three (3) times daily as needed for Muscle Spasm(s) for up to 12 doses. 12 Tab 0    naproxen sodium (ALEVE) 220 mg cap Take  by mouth.  dutasteride (AVODART) 0.5 mg capsule TAKE 1 CAPSULE BY MOUTH DAILY 90 Cap 2    tamsulosin (FLOMAX) 0.4 mg capsule TAKE TWO CAPSULES BY MOUTH DAILY AFTER DINNER 180 Cap 2    atorvastatin (LIPITOR) 20 mg tablet   3    amLODIPine (NORVASC) 10 mg tablet TAKE 1 TABLET BY MOUTH ONCE A DAY      benazepril (LOTENSIN) 40 mg tablet TAKE ONE TABLET BY MOUTH ONCE A DAY      glipiZIDE (GLUCOTROL) 10 mg tablet 10 mg.      hydroCHLOROthiazide (HYDRODIURIL) 25 mg tablet TAKE 1 TABLET BY MOUTH ONCE A DAY.  metFORMIN (GLUCOPHAGE) 850 mg tablet TAKE ONE TABLET BY MOUTH TWICE DAILY WITH MEALS      nebivolol (BYSTOLIC) 10 mg tablet TAKE 2 TABLETS BY MOUTH ONCE A DAY.  mometasone (NASONEX) 50 mcg/actuation nasal spray 2 Sprays daily.       omeprazole (PRILOSEC) 10 mg capsule Take 10 mg by mouth as needed.  DOCOSAHEXANOIC ACID/EPA (FISH OIL PO) Take 1 Tab by mouth daily. 1200 mg with 360 mg omega-3      aspirin 81 mg tablet Take 81 mg by mouth daily. Past History     Past Medical History:  Past Medical History:   Diagnosis Date    Benign non-nodular prostatic hyperplasia with lower urinary tract symptoms     Benign prostatic hyperplasia with lower urinary tract symptoms     BPH (benign prostatic hypertrophy)     BPH with obstruction/lower urinary tract symptoms     Cerebral artery occlusion with cerebral infarction (HCC)     CKD (chronic kidney disease)     stage I    Complex renal cyst     Diabetes mellitus (HCC)     insulin dependent type 2, with stage 1 CKD    Difficulty urinating     Dyslipidemia associated with type 2 diabetes mellitus (HCC)     Enlarged prostate     Gross hematuria     Hematuria     unspec.  Hypertension     Hypertension associated with diabetes ([facility] [address]     Perennial allergic rhinitis     Renal cyst     Retention of urine, unspecified     Urinary frequency        Past Surgical History:  Past Surgical History:   Procedure Laterality Date    HX APPENDECTOMY  1956    HX UROLOGICAL  10/01/08    [facility] C[facility] [place], Cysto-Clot evacuation & Fulguration of prostate, Dr Alvino Kelley       Family History:  Family History   Problem Relation Age of Onset    Diabetes Sister     Hypertension Other     Heart Disease Father     Heart Failure Father     Dementia Maternal Grandmother     Heart Disease Maternal Grandfather     Cancer Sister        Social History:  Social History     Tobacco Use    Smoking status: Never Smoker    Smokeless tobacco: Never Used   Substance Use Topics    Alcohol use: Yes     Alcohol/week: 0.0 standard drinks     Comment: occ    Drug use: No       Allergies:   Allergies   Allergen Reactions    Seasonale [Levonorgestrel-Ethinyl Estrad] Other (comments)     Nasal symptoms  Nasal symptoms         Review of Systems   Review of Systems   Constitutional: Negative for chills, fatigue and fever. HENT: Negative for congestion, rhinorrhea, sore throat and trouble swallowing. Neck pain as per HPI   Eyes: Negative for discharge, redness and itching. Respiratory: Negative for cough, shortness of breath, wheezing and stridor. Cardiovascular: Negative for chest pain, palpitations and leg swelling. Gastrointestinal: Negative for abdominal pain, blood in stool, diarrhea, nausea and vomiting. Genitourinary: Negative for difficulty urinating and dysuria. Musculoskeletal: Positive for myalgias, neck pain and neck stiffness. Negative for arthralgias, back pain, gait problem and joint swelling. Skin: Negative for rash. Neurological: Negative for dizziness, tremors, seizures, syncope, facial asymmetry, speech difficulty, weakness, light-headedness, numbness and headaches. Hematological: Does not bruise/bleed easily. Psychiatric/Behavioral: Negative for behavioral problems and confusion. All other systems reviewed and are negative. Physical Exam     Vitals:    11/12/19 1036   BP: 156/73   Pulse: 60   Resp: 16   Temp: 98.9 °F (37.2 °C)   SpO2: 95%   Weight: 77.1 kg (170 lb)   Height: 5' 9\" (1.753 m)     Physical Exam   Constitutional: He is oriented to person, place, and time. He appears well-developed and well-nourished. No distress. HENT:   Head: Normocephalic and atraumatic. Mouth/Throat: Oropharynx is clear and moist.   Bilateral upper cervical paraspinous muscle spasm and tenderness to palpation that reproduces his pain. No midline or bony tenderness to palpation. No skin changes. Pain is in the neck area, there is no pain in his head. Eyes: Pupils are equal, round, and reactive to light. Conjunctivae and EOM are normal.   Neck: Normal range of motion. Neck supple. Cardiovascular: Normal rate, regular rhythm and normal heart sounds. No murmur heard.   Pulmonary/Chest: Effort normal and breath sounds normal. He has no wheezes. Abdominal: Soft. Bowel sounds are normal. There is no tenderness. Musculoskeletal: Normal range of motion. He exhibits no tenderness. Neurological: He is alert and oriented to person, place, and time. Motor 5 out of 5 bilateral upper extremities proximally and distally, sensation intact to light touch throughout. Normal gait. Skin: Skin is warm and dry. No rash noted. Psychiatric: He has a normal mood and affect. His behavior is normal.         Diagnostic Study Results     Labs -   No results found for this or any previous visit (from the past 12 hour(s)). Radiologic Studies -   No orders to display     CT Results  (Last 48 hours)    None        CXR Results  (Last 48 hours)    None            Medical Decision Making   I am the first provider for this patient. I reviewed the vital signs, available nursing notes, past medical history, past surgical history, family history and social history. Vital Signs-Reviewed the patient's vital signs. Disposition:  Discharge home    DISCHARGE NOTE:     Pt has been reexamined. Patient has no new complaints, changes, or physical findings. Care plan outlined and precautions discussed. All medications were reviewed with the patient; will d/c home with Flexeril and Tylenol. All of pt's questions and concerns were addressed. Patient was instructed and agrees to follow up with his primary care provider, as well as to return to the ED upon further deterioration. Patient is ready to go home.     Follow-up Information     Follow up With Specialties Details Why Contact Info    Maxine Nick MD Internal Medicine Call in 2 days As needed, If symptoms worsen [address] [facility] EMERGENCY DEPT Emergency Medicine  As needed, If symptoms worsen [address] [facility] [address].  [phone]          Current Discharge Medication List      START taking these medications    Details cyclobenzaprine (FLEXERIL) 5 mg tablet Take 1 Tab by mouth three (3) times daily as needed for Muscle Spasm(s) for up to 12 doses. Qty: 12 Tab, Refills: 0         CONTINUE these medications which have NOT CHANGED    Details   naproxen sodium (ALEVE) 220 mg cap Take  by mouth. dutasteride (AVODART) 0.5 mg capsule TAKE 1 CAPSULE BY MOUTH DAILY  Qty: 90 Cap, Refills: 2    Associated Diagnoses: Benign non-nodular prostatic hyperplasia with lower urinary tract symptoms; Retention of urine, unspecified      tamsulosin (FLOMAX) 0.4 mg capsule TAKE TWO CAPSULES BY MOUTH DAILY AFTER DINNER  Qty: 180 Cap, Refills: 2    Associated Diagnoses: Benign non-nodular prostatic hyperplasia with lower urinary tract symptoms; Retention of urine      atorvastatin (LIPITOR) 20 mg tablet Refills: 3    Associated Diagnoses: Benign prostatic hyperplasia, unspecified whether lower urinary tract symptoms present      amLODIPine (NORVASC) 10 mg tablet TAKE 1 TABLET BY MOUTH ONCE A DAY    Associated Diagnoses: Benign non-nodular prostatic hyperplasia with lower urinary tract symptoms; Retention of urine, unspecified      benazepril (LOTENSIN) 40 mg tablet TAKE ONE TABLET BY MOUTH ONCE A DAY    Associated Diagnoses: Benign non-nodular prostatic hyperplasia with lower urinary tract symptoms; Retention of urine, unspecified      glipiZIDE (GLUCOTROL) 10 mg tablet 10 mg. Associated Diagnoses: Benign non-nodular prostatic hyperplasia with lower urinary tract symptoms; Retention of urine, unspecified      hydroCHLOROthiazide (HYDRODIURIL) 25 mg tablet TAKE 1 TABLET BY MOUTH ONCE A DAY. Associated Diagnoses: Benign non-nodular prostatic hyperplasia with lower urinary tract symptoms; Retention of urine, unspecified      metFORMIN (GLUCOPHAGE) 850 mg tablet TAKE ONE TABLET BY MOUTH TWICE DAILY WITH MEALS    Associated Diagnoses: Benign non-nodular prostatic hyperplasia with lower urinary tract symptoms;  Retention of urine, unspecified nebivolol (BYSTOLIC) 10 mg tablet TAKE 2 TABLETS BY MOUTH ONCE A DAY. Associated Diagnoses: Benign non-nodular prostatic hyperplasia with lower urinary tract symptoms; Retention of urine, unspecified      mometasone (NASONEX) 50 mcg/actuation nasal spray 2 Sprays daily. omeprazole (PRILOSEC) 10 mg capsule Take 10 mg by mouth as needed. DOCOSAHEXANOIC ACID/EPA (FISH OIL PO) Take 1 Tab by mouth daily. 1200 mg with 360 mg omega-3      aspirin 81 mg tablet Take 81 mg by mouth daily. Provider Notes (Medical Decision Making):   Cervical sprain. No evidence of fracture, meningitis, CVA, significant herniated disc or neurologic deficit, or other acute life threat. Symptom medic care, close outpatient PCP follow-up. Diagnosis     Clinical Impression:   1.  Strain of neck muscle, initial encounter

## 2019-11-12 NOTE — DISCHARGE INSTRUCTIONS
Patient Education        Neck Strain: Care Instructions  Your Care Instructions    You have strained the muscles and ligaments in your neck. A sudden, awkward movement can strain the neck. This often occurs with falls or car accidents or during certain sports. Everyday activities like working on a computer or sleeping can also cause neck strain if they force you to hold your neck in an awkward position for a long time. It is common for neck pain to get worse for a day or two after an injury, but it should start to feel better after that. You may have more pain and stiffness for several days before it gets better. This is expected. It may take a few weeks or longer for it to heal completely. Good home treatment can help you get better faster and avoid future neck problems. Follow-up care is a key part of your treatment and safety. Be sure to make and go to all appointments, and call your doctor if you are having problems. It's also a good idea to know your test results and keep a list of the medicines you take. How can you care for yourself at home? · If you were given a neck brace (cervical collar) to limit neck motion, wear it as instructed for as many days as your doctor tells you to. Do not wear it longer than you were told to. Wearing a brace for too long can make neck stiffness worse and weaken the neck muscles. · You can try using heat or ice to see if it helps. ? Try using a heating pad on a low or medium setting for 15 to 20 minutes every 2 to 3 hours. Try a warm shower in place of one session with the heating pad. You can also buy single-use heat wraps that last up to 8 hours. ? You can also try an ice pack for 10 to 15 minutes every 2 to 3 hours. · Take pain medicines exactly as directed. ? If the doctor gave you a prescription medicine for pain, take it as prescribed. ? If you are not taking a prescription pain medicine, ask your doctor if you can take an over-the-counter medicine.   · Gently rub the area to relieve pain and help with blood flow. Do not massage the area if it hurts to do so. · Do not do anything that makes the pain worse. Take it easy for a couple of days. You can do your usual activities if they do not hurt your neck or put it at risk for more stress or injury. · Try sleeping on a special neck pillow. Place it under your neck, not under your head. Placing a tightly rolled-up towel under your neck while you sleep will also work. If you use a neck pillow or rolled towel, do not use your regular pillow at the same time. · To prevent future neck pain, do exercises to stretch and strengthen your neck and back. Learn how to use good posture, safe lifting techniques, and proper body mechanics. When should you call for help? Call 911 anytime you think you may need emergency care. For example, call if:    · You are unable to move an arm or a leg at all.   [facility] your doctor now or seek immediate medical care if:    · You have new or worse symptoms in your arms, legs, chest, belly, or buttocks. Symptoms may include:  ? Numbness or tingling. ? Weakness. ? Pain.     · You lose bladder or bowel control.    Watch closely for changes in your health, and be sure to contact your doctor if:    · You are not getting better as expected. Where can you learn more? Go to http://ifrah-hayden.info/. Enter M253 in the search box to learn more about \"Neck Strain: Care Instructions. \"  Current as of: June 26, 2019  Content Version: 12.2  © 2896-4934 [employer]. Care instructions adapted under license by [employer] (which disclaims liability or warranty for this information). If you have questions about a medical condition or this instruction, always ask your healthcare professional. [address] any warranty or liability for your use of this information.

## 2019-11-12 NOTE — ED TRIAGE NOTES
Patient c/o posterior headache since yesterday with pain in neck since yesterday with no known injury.

## 2020-04-16 ENCOUNTER — HOSPITAL ENCOUNTER (OUTPATIENT)
Age: 75
Setting detail: OBSERVATION
Discharge: HOME | End: 2020-04-17
Attending: EMERGENCY MEDICINE | Admitting: HOSPITALIST
Payer: MEDICARE

## 2020-04-16 ENCOUNTER — APPOINTMENT (OUTPATIENT)
Dept: CT IMAGING | Age: 75
End: 2020-04-16
Attending: EMERGENCY MEDICINE
Payer: MEDICARE

## 2020-04-16 DIAGNOSIS — N17.9 AKI (ACUTE KIDNEY INJURY) (HCC): ICD-10-CM

## 2020-04-16 DIAGNOSIS — R33.8 ACUTE URINARY RETENTION: Primary | ICD-10-CM

## 2020-04-16 PROBLEM — N13.9 LOWER OBSTRUCTIVE UROPATHY: Status: ACTIVE | Noted: 2020-04-16

## 2020-04-16 PROBLEM — I16.0 HYPERTENSIVE URGENCY: Status: ACTIVE | Noted: 2020-04-16

## 2020-04-16 LAB
ALBUMIN SERPL-MCNC: 3.8 G/DL (ref 3.4–5)
ALBUMIN/GLOB SERPL: 1 {RATIO} (ref 0.8–1.7)
ALP SERPL-CCNC: 85 U/L (ref 45–117)
ALT SERPL-CCNC: 36 U/L (ref 16–61)
ANION GAP SERPL CALC-SCNC: 13 MMOL/L (ref 3–18)
APPEARANCE UR: ABNORMAL
AST SERPL-CCNC: 33 U/L (ref 10–38)
BACTERIA URNS QL MICRO: ABNORMAL /HPF
BASOPHILS # BLD: 0 K/UL (ref 0–0.1)
BASOPHILS NFR BLD: 0 % (ref 0–2)
BILIRUB SERPL-MCNC: 1.1 MG/DL (ref 0.2–1)
BILIRUB UR QL: NEGATIVE
BUN SERPL-MCNC: 44 MG/DL (ref 7–18)
BUN/CREAT SERPL: 15 (ref 12–20)
CALCIUM SERPL-MCNC: 8.9 MG/DL (ref 8.5–10.1)
CHLORIDE SERPL-SCNC: 104 MMOL/L (ref 100–111)
CO2 SERPL-SCNC: 23 MMOL/L (ref 21–32)
COLOR UR: YELLOW
CREAT SERPL-MCNC: 2.9 MG/DL (ref 0.6–1.3)
DIFFERENTIAL METHOD BLD: ABNORMAL
EOSINOPHIL # BLD: 0 K/UL (ref 0–0.4)
EOSINOPHIL NFR BLD: 0 % (ref 0–5)
EPITH CASTS URNS QL MICRO: ABNORMAL /LPF (ref 0–5)
ERYTHROCYTE [DISTWIDTH] IN BLOOD BY AUTOMATED COUNT: 12.5 % (ref 11.6–14.5)
EST. AVERAGE GLUCOSE BLD GHB EST-MCNC: 194 MG/DL
GLOBULIN SER CALC-MCNC: 3.7 G/DL (ref 2–4)
GLUCOSE BLD STRIP.AUTO-MCNC: 100 MG/DL (ref 70–110)
GLUCOSE BLD STRIP.AUTO-MCNC: 230 MG/DL (ref 70–110)
GLUCOSE BLD STRIP.AUTO-MCNC: 286 MG/DL (ref 70–110)
GLUCOSE SERPL-MCNC: 246 MG/DL (ref 74–99)
GLUCOSE UR STRIP.AUTO-MCNC: NEGATIVE MG/DL
HBA1C MFR BLD: 8.4 % (ref 4.2–5.6)
HCT VFR BLD AUTO: 43.1 % (ref 36–48)
HGB BLD-MCNC: 14.8 G/DL (ref 13–16)
HGB UR QL STRIP: ABNORMAL
KETONES UR QL STRIP.AUTO: NEGATIVE MG/DL
LEUKOCYTE ESTERASE UR QL STRIP.AUTO: ABNORMAL
LYMPHOCYTES # BLD: 1.1 K/UL (ref 0.9–3.6)
LYMPHOCYTES NFR BLD: 10 % (ref 21–52)
MCH RBC QN AUTO: 31 PG (ref 24–34)
MCHC RBC AUTO-ENTMCNC: 34.3 G/DL (ref 31–37)
MCV RBC AUTO: 90.2 FL (ref 74–97)
MONOCYTES # BLD: 1 K/UL (ref 0.05–1.2)
MONOCYTES NFR BLD: 9 % (ref 3–10)
NEUTS SEG # BLD: 8.8 K/UL (ref 1.8–8)
NEUTS SEG NFR BLD: 81 % (ref 40–73)
NITRITE UR QL STRIP.AUTO: NEGATIVE
PH UR STRIP: 6.5 [PH] (ref 5–8)
PLATELET # BLD AUTO: 192 K/UL (ref 135–420)
PMV BLD AUTO: 12.7 FL (ref 9.2–11.8)
POTASSIUM SERPL-SCNC: 3.7 MMOL/L (ref 3.5–5.5)
PROT SERPL-MCNC: 7.5 G/DL (ref 6.4–8.2)
PROT UR STRIP-MCNC: 30 MG/DL
RBC # BLD AUTO: 4.78 M/UL (ref 4.7–5.5)
RBC #/AREA URNS HPF: ABNORMAL /HPF (ref 0–5)
SODIUM SERPL-SCNC: 140 MMOL/L (ref 136–145)
SP GR UR REFRACTOMETRY: 1.01 (ref 1–1.03)
UROBILINOGEN UR QL STRIP.AUTO: 0.2 EU/DL (ref 0.2–1)
WBC # BLD AUTO: 10.9 K/UL (ref 4.6–13.2)
WBC URNS QL MICRO: ABNORMAL /HPF (ref 0–4)

## 2020-04-16 PROCEDURE — 96374 THER/PROPH/DIAG INJ IV PUSH: CPT

## 2020-04-16 PROCEDURE — 99284 EMERGENCY DEPT VISIT MOD MDM: CPT

## 2020-04-16 PROCEDURE — 74011000258 HC RX REV CODE- 258: Performed by: EMERGENCY MEDICINE

## 2020-04-16 PROCEDURE — 81001 URINALYSIS AUTO W/SCOPE: CPT

## 2020-04-16 PROCEDURE — 74011250636 HC RX REV CODE- 250/636: Performed by: EMERGENCY MEDICINE

## 2020-04-16 PROCEDURE — 65390000012 HC CONDITION CODE 44 OBSERVATION

## 2020-04-16 PROCEDURE — 74176 CT ABD & PELVIS W/O CONTRAST: CPT

## 2020-04-16 PROCEDURE — 74011636637 HC RX REV CODE- 636/637: Performed by: HOSPITALIST

## 2020-04-16 PROCEDURE — 87086 URINE CULTURE/COLONY COUNT: CPT

## 2020-04-16 PROCEDURE — 51702 INSERT TEMP BLADDER CATH: CPT

## 2020-04-16 PROCEDURE — 80053 COMPREHEN METABOLIC PANEL: CPT

## 2020-04-16 PROCEDURE — 65270000029 HC RM PRIVATE

## 2020-04-16 PROCEDURE — 96375 TX/PRO/DX INJ NEW DRUG ADDON: CPT

## 2020-04-16 PROCEDURE — 87186 SC STD MICRODIL/AGAR DIL: CPT

## 2020-04-16 PROCEDURE — 74011250636 HC RX REV CODE- 250/636

## 2020-04-16 PROCEDURE — 74011250637 HC RX REV CODE- 250/637: Performed by: INTERNAL MEDICINE

## 2020-04-16 PROCEDURE — 87077 CULTURE AEROBIC IDENTIFY: CPT

## 2020-04-16 PROCEDURE — 82962 GLUCOSE BLOOD TEST: CPT

## 2020-04-16 PROCEDURE — 74011250636 HC RX REV CODE- 250/636: Performed by: INTERNAL MEDICINE

## 2020-04-16 PROCEDURE — 96365 THER/PROPH/DIAG IV INF INIT: CPT

## 2020-04-16 PROCEDURE — 85025 COMPLETE CBC W/AUTO DIFF WBC: CPT

## 2020-04-16 PROCEDURE — 74011000250 HC RX REV CODE- 250: Performed by: EMERGENCY MEDICINE

## 2020-04-16 PROCEDURE — 83036 HEMOGLOBIN GLYCOSYLATED A1C: CPT

## 2020-04-16 RX ORDER — ACETAMINOPHEN 325 MG/1
650 TABLET ORAL
Status: DISCONTINUED | OUTPATIENT
Start: 2020-04-16 | End: 2020-04-17 | Stop reason: HOSPADM

## 2020-04-16 RX ORDER — SODIUM CHLORIDE 9 MG/ML
125 INJECTION, SOLUTION INTRAVENOUS CONTINUOUS
Status: DISCONTINUED | OUTPATIENT
Start: 2020-04-16 | End: 2020-04-17 | Stop reason: HOSPADM

## 2020-04-16 RX ORDER — MORPHINE SULFATE 4 MG/ML
INJECTION, SOLUTION INTRAMUSCULAR; INTRAVENOUS
Status: COMPLETED
Start: 2020-04-16 | End: 2020-04-16

## 2020-04-16 RX ORDER — LANOLIN ALCOHOL/MO/W.PET/CERES
12 CREAM (GRAM) TOPICAL
Status: DISCONTINUED | OUTPATIENT
Start: 2020-04-16 | End: 2020-04-17 | Stop reason: HOSPADM

## 2020-04-16 RX ORDER — FENTANYL CITRATE 50 UG/ML
25 INJECTION, SOLUTION INTRAMUSCULAR; INTRAVENOUS
Status: DISCONTINUED | OUTPATIENT
Start: 2020-04-16 | End: 2020-04-16

## 2020-04-16 RX ORDER — PANTOPRAZOLE SODIUM 40 MG/10ML
40 INJECTION, POWDER, LYOPHILIZED, FOR SOLUTION INTRAVENOUS
Status: ACTIVE | OUTPATIENT
Start: 2020-04-16 | End: 2020-04-17

## 2020-04-16 RX ORDER — LIDOCAINE HYDROCHLORIDE 20 MG/ML
JELLY TOPICAL ONCE
Status: COMPLETED | OUTPATIENT
Start: 2020-04-16 | End: 2020-04-16

## 2020-04-16 RX ORDER — DOCUSATE SODIUM 100 MG/1
100 CAPSULE, LIQUID FILLED ORAL
Status: DISCONTINUED | OUTPATIENT
Start: 2020-04-16 | End: 2020-04-17 | Stop reason: HOSPADM

## 2020-04-16 RX ORDER — IPRATROPIUM BROMIDE AND ALBUTEROL SULFATE 2.5; .5 MG/3ML; MG/3ML
3 SOLUTION RESPIRATORY (INHALATION)
Status: DISCONTINUED | OUTPATIENT
Start: 2020-04-16 | End: 2020-04-17 | Stop reason: HOSPADM

## 2020-04-16 RX ORDER — OXYCODONE AND ACETAMINOPHEN 5; 325 MG/1; MG/1
1 TABLET ORAL
Status: DISCONTINUED | OUTPATIENT
Start: 2020-04-16 | End: 2020-04-17 | Stop reason: HOSPADM

## 2020-04-16 RX ORDER — ASPIRIN 81 MG/1
81 TABLET ORAL DAILY
Status: DISCONTINUED | OUTPATIENT
Start: 2020-04-16 | End: 2020-04-17 | Stop reason: HOSPADM

## 2020-04-16 RX ORDER — TAMSULOSIN HYDROCHLORIDE 0.4 MG/1
0.8 CAPSULE ORAL DAILY
Status: DISCONTINUED | OUTPATIENT
Start: 2020-04-16 | End: 2020-04-17 | Stop reason: HOSPADM

## 2020-04-16 RX ORDER — HYDRALAZINE HYDROCHLORIDE 20 MG/ML
20 INJECTION INTRAMUSCULAR; INTRAVENOUS
Status: DISCONTINUED | OUTPATIENT
Start: 2020-04-16 | End: 2020-04-17 | Stop reason: HOSPADM

## 2020-04-16 RX ORDER — INSULIN LISPRO 100 [IU]/ML
INJECTION, SOLUTION INTRAVENOUS; SUBCUTANEOUS
Status: DISCONTINUED | OUTPATIENT
Start: 2020-04-16 | End: 2020-04-17 | Stop reason: HOSPADM

## 2020-04-16 RX ORDER — INSULIN GLARGINE 100 [IU]/ML
10 INJECTION, SOLUTION SUBCUTANEOUS DAILY
Status: DISCONTINUED | OUTPATIENT
Start: 2020-04-16 | End: 2020-04-17 | Stop reason: HOSPADM

## 2020-04-16 RX ORDER — ATORVASTATIN CALCIUM 20 MG/1
20 TABLET, FILM COATED ORAL DAILY
Status: DISCONTINUED | OUTPATIENT
Start: 2020-04-16 | End: 2020-04-17 | Stop reason: HOSPADM

## 2020-04-16 RX ORDER — ONDANSETRON 2 MG/ML
4 INJECTION INTRAMUSCULAR; INTRAVENOUS
Status: DISCONTINUED | OUTPATIENT
Start: 2020-04-16 | End: 2020-04-17 | Stop reason: HOSPADM

## 2020-04-16 RX ORDER — FLUTICASONE PROPIONATE 50 MCG
2 SPRAY, SUSPENSION (ML) NASAL DAILY
Status: DISCONTINUED | OUTPATIENT
Start: 2020-04-16 | End: 2020-04-17 | Stop reason: HOSPADM

## 2020-04-16 RX ORDER — MORPHINE SULFATE 4 MG/ML
4 INJECTION INTRAVENOUS
Status: COMPLETED | OUTPATIENT
Start: 2020-04-16 | End: 2020-04-16

## 2020-04-16 RX ORDER — DUTASTERIDE 0.5 MG/1
0.5 CAPSULE, LIQUID FILLED ORAL DAILY
Status: DISCONTINUED | OUTPATIENT
Start: 2020-04-16 | End: 2020-04-17 | Stop reason: HOSPADM

## 2020-04-16 RX ORDER — AMLODIPINE BESYLATE 10 MG/1
10 TABLET ORAL DAILY
Status: DISCONTINUED | OUTPATIENT
Start: 2020-04-16 | End: 2020-04-17 | Stop reason: HOSPADM

## 2020-04-16 RX ORDER — NALOXONE HYDROCHLORIDE 0.4 MG/ML
0.4 INJECTION, SOLUTION INTRAMUSCULAR; INTRAVENOUS; SUBCUTANEOUS AS NEEDED
Status: DISCONTINUED | OUTPATIENT
Start: 2020-04-16 | End: 2020-04-17 | Stop reason: HOSPADM

## 2020-04-16 RX ORDER — MAGNESIUM SULFATE 100 %
4 CRYSTALS MISCELLANEOUS AS NEEDED
Status: DISCONTINUED | OUTPATIENT
Start: 2020-04-16 | End: 2020-04-17 | Stop reason: HOSPADM

## 2020-04-16 RX ORDER — ONDANSETRON 2 MG/ML
4 INJECTION INTRAMUSCULAR; INTRAVENOUS
Status: COMPLETED | OUTPATIENT
Start: 2020-04-16 | End: 2020-04-16

## 2020-04-16 RX ORDER — NEBIVOLOL 5 MG/1
20 TABLET ORAL DAILY
Status: DISCONTINUED | OUTPATIENT
Start: 2020-04-16 | End: 2020-04-17 | Stop reason: HOSPADM

## 2020-04-16 RX ORDER — INSULIN LISPRO 100 [IU]/ML
INJECTION, SOLUTION INTRAVENOUS; SUBCUTANEOUS
Status: DISCONTINUED | OUTPATIENT
Start: 2020-04-16 | End: 2020-04-16

## 2020-04-16 RX ADMIN — LIDOCAINE HYDROCHLORIDE: 20 JELLY TOPICAL at 02:46

## 2020-04-16 RX ADMIN — SODIUM CHLORIDE 125 ML/HR: 900 INJECTION, SOLUTION INTRAVENOUS at 17:10

## 2020-04-16 RX ADMIN — INSULIN LISPRO 6 UNITS: 100 INJECTION, SOLUTION INTRAVENOUS; SUBCUTANEOUS at 17:10

## 2020-04-16 RX ADMIN — SODIUM CHLORIDE 125 ML/HR: 900 INJECTION, SOLUTION INTRAVENOUS at 23:19

## 2020-04-16 RX ADMIN — SODIUM CHLORIDE 125 ML/HR: 900 INJECTION, SOLUTION INTRAVENOUS at 08:20

## 2020-04-16 RX ADMIN — INSULIN GLARGINE 10 UNITS: 100 INJECTION, SOLUTION SUBCUTANEOUS at 12:29

## 2020-04-16 RX ADMIN — LIDOCAINE HYDROCHLORIDE: 20 JELLY TOPICAL at 07:38

## 2020-04-16 RX ADMIN — DUTASTERIDE 0.5 MG: 0.5 CAPSULE, LIQUID FILLED ORAL at 11:00

## 2020-04-16 RX ADMIN — TAMSULOSIN HYDROCHLORIDE 0.8 MG: 0.4 CAPSULE ORAL at 10:25

## 2020-04-16 RX ADMIN — ONDANSETRON 4 MG: 2 INJECTION INTRAMUSCULAR; INTRAVENOUS at 05:10

## 2020-04-16 RX ADMIN — MORPHINE SULFATE 4 MG: 4 INJECTION, SOLUTION INTRAMUSCULAR; INTRAVENOUS at 05:10

## 2020-04-16 RX ADMIN — AMLODIPINE BESYLATE 10 MG: 10 TABLET ORAL at 10:25

## 2020-04-16 RX ADMIN — NEBIVOLOL HYDROCHLORIDE 20 MG: 5 TABLET ORAL at 10:25

## 2020-04-16 RX ADMIN — ATORVASTATIN CALCIUM 20 MG: 20 TABLET, FILM COATED ORAL at 10:25

## 2020-04-16 RX ADMIN — FLUTICASONE PROPIONATE 2 SPRAY: 50 SPRAY, METERED NASAL at 10:25

## 2020-04-16 RX ADMIN — CEFTRIAXONE 1 G: 1 INJECTION, POWDER, FOR SOLUTION INTRAMUSCULAR; INTRAVENOUS at 06:53

## 2020-04-16 RX ADMIN — MORPHINE SULFATE 4 MG: 4 INJECTION INTRAVENOUS at 05:10

## 2020-04-16 RX ADMIN — ASPIRIN 81 MG: 81 TABLET, COATED ORAL at 10:25

## 2020-04-16 RX ADMIN — INSULIN LISPRO 9 UNITS: 100 INJECTION, SOLUTION INTRAVENOUS; SUBCUTANEOUS at 12:23

## 2020-04-16 NOTE — ED NOTES
Per Kareem Esquivel pt is to be seen by urology to have hinson cath placed then needs to go to CT can as ordered. RN Supervisor Razia aware. Report called to floor, pt will go up urology procedure and CT scan.

## 2020-04-16 NOTE — ED NOTES
Urologist in the room. OR contacted to bring open ended urethral cath and urethral dilators per Urologist MD order.

## 2020-04-16 NOTE — H&P
History and Physical    Patient: Lelia Dillon MRN: [id number]  SSN: [id number]    YOB: 1945  Age: 76 y.o. Sex: male      Subjective:      Lelia Dillon is a 76 y.o. male who presents to [facility] ER with complaint of Urinary Rentention. Patient states that for approximately the last 11 days that he has been urinating with straining, pain, and difficulty. Patient states that he has positionally maneuver and elevate one leg when urinating which requiring straining and caused a urethral, \"burning\" pain that gradually worsened and became a 10/10 intensity pain. Patient states that the pain was worst when urinating and right before urination, but also would hurt in the region of he suspects is his prostate. Patient states that he had increased instance of urinating with smaller volumes at each instance. Patient states that over the last few weeks he noted blood in his underwear twice that he determined came from his urethra. Patient also complains of predominantly constipation with short-lived episodes of diarrhea, scant nasal drainage with sore throat (which he attributes to expected, recurrent seasonal allergies), and a cough that he normally associated with this phenomena. Patient also remarks that he has been having some dizziness upon standing up from a seated or laying position. Patient denies fevers, nausea, vomiting, and chest pain. Notably, Patient has had previous TURP, Cystoscopy, and his Primary Urologist is Dr. Lucia Nichols. Patient reports episodic recurrence of urinary retention that requires placement of a hinson catheter for maybe a week at a time and then removal many times before. In [facility] ER, Patient was noted to have Cr 2.90 (Baseline 0.08-0.94), Glucose 246 mg/dL, WBC 10.9, and Blood Pressure 178/102 mm Hg. Bladder Scan indicated that Patient's Bladder contained 9,999 mL of Urine. Numerous attempts to place Hinson Catheters and Coudes failed.   [facility] ER Physician Consulted Urological services. Patient is admitted to [facility] Medical Unit (non-Covid-19 Cohort) for Acute Kidney Injury Stage III 2°/2 Obstructive Uropathy, Hypertensive Urgency, and Hyperglycemia. Past Medical History:   Diagnosis Date    BPH with obstruction/lower urinary tract symptoms     S/P TURP    Cerebral artery occlusion with cerebral infarction [facility])     Patient states his PCP diagnosed him \"just by looking at [him]\" and then started him on Aspirin    CKD (chronic kidney disease)     stage I    Diabetes mellitus ([address]     insulin dependent type 2, with stage 1 CKD    Hematuria     unspec.  HLD (hyperlipidemia)     HTN (hypertension)     Hypertension     Perennial allergic rhinitis     Retention of urine, unspecified      Past Surgical History:   Procedure Laterality Date    HX APPENDECTOMY  1956    HX UROLOGICAL  10/01/08    [address], Cysto-Clot evacuation & Fulguration of prostate, Dr [facility]      Family History   Problem Relation Age of Onset    Diabetes Sister     Hypertension Other     Heart Disease Father     Heart Failure Father     Dementia Maternal Grandmother     Heart Disease Maternal Grandfather     Cancer Sister         Stomach Cancer     Social History     Tobacco Use    Smoking status: Never Smoker    Smokeless tobacco: Never Used   Substance Use Topics    Alcohol use: Yes     Alcohol/week: 0.0 standard drinks     Comment: occ      Patient lives at home with a Family. Patient is up ad deann at home. Patient is a Never Smoker who denies Vaping, ETOH consumption, and Illicit Drug Use. Prior to Admission medications    Medication Sig Start Date End Date Taking?  Authorizing Provider   dutasteride (AVODART) 0.5 mg capsule TAKE 1 CAPSULE BY MOUTH DAILY 5/7/18   Emmy Goldman MD   tamsulosin [facility]) 0.4 mg capsule TAKE TWO CAPSULES BY MOUTH DAILY AFTER DINNER 1/15/18   Emmy Goldman MD   atorvastatin (LIPITOR) 20 mg tablet  10/24/17   Provider, Historical   amLODIPine (NORVASC) 10 mg tablet TAKE 1 TABLET BY MOUTH ONCE A DAY 5/16/17   Provider, Historical   benazepril (LOTENSIN) 40 mg tablet TAKE ONE TABLET BY MOUTH ONCE A DAY 5/16/17   Provider, Historical   glipiZIDE (GLUCOTROL) 10 mg tablet 10 mg. 3/15/17   Provider, Historical   hydroCHLOROthiazide (HYDRODIURIL) 25 mg tablet TAKE 1 TABLET BY MOUTH ONCE A DAY. 4/19/17   Provider, Historical   metFORMIN (GLUCOPHAGE) 850 mg tablet TAKE ONE TABLET BY MOUTH TWICE DAILY WITH MEALS 7/18/17   Provider, Historical   nebivolol (BYSTOLIC) 10 mg tablet TAKE 2 TABLETS BY MOUTH ONCE A DAY. 5/16/17   Provider, Historical   mometasone (NASONEX) 50 mcg/actuation nasal spray 2 Sprays daily. Provider, Historical   omeprazole (PRILOSEC) 10 mg capsule Take 10 mg by mouth as needed. Provider, Historical   DOCOSAHEXANOIC ACID/EPA (FISH OIL PO) Take 1 Tab by mouth daily. 1200 mg with 360 mg omega-3    Other, MD Kathie   aspirin 81 mg tablet Take 81 mg by mouth daily. Other, MD Kathie        Allergies   Allergen Reactions    Seasonale [Levonorgestrel-Ethinyl Estrad] Other (comments)     Nasal symptoms  Nasal symptoms       Review of Systems:  (+) Chills  (+) Nasal Discharge  (+) Sore Throat  (+) Cough  (+) Abdominal Pain  (+) Constipation  (+) Increased Urinary Frequency  (+) Hesitation  (+) Dysuria  (+) Hematuria (twice in the last few weeks)  (+) Faintness (Infrequent;  Sounds like orthopnea)  (+) Polyuria  (~) Diarrhea  (-) Fevers  (-) Increased Sputum Production  (-) Nausea  (-) Vomiting  (-) Urinary Urgency  All other systems have been reviewed and are negative      Objective:     Vitals:    04/16/20 0242 04/16/20 0530   BP: (!) 178/102 144/62   Pulse: 66    Resp: 17    Temp: 98.1 °F (36.7 °C)    SpO2: 98% 94%   Weight: 82.6 kg (182 lb)    Height: 5' 9\" (1.753 m)         Physical Exam:  General:  Older Adult male lying in bed in no acute distress  HEENT:  Atraumatic, normocephalic; Pupils equally round and reactive to light with accommodation; Extraocular muscles intact; (+) Somewhat Dry Oropharynx without Minimal erythema and Scant exudates without edema; (+) Glasses in place; (+) Procedure Mask in place  Neck:  No Bruits; No Lymphadenopathy  Chest:  No pectus carinatum; No pectus excavatum  Cardiovascular:  Regular rate and rhythm without rubs, gallops, or murmurs  Respiratory:  (+) Transient, Slight Rales of Right lung base; otherwise, Clear to Auscultation Bilaterally without wheezes or rhonchi; normal effort of breathing  Abdominal:  Soft, semi-tense, (+) Tender Lower Abdomen, (+) Patient defers deep palpation; BS present  :  Deferred  Extremities:  Pulses 2+ x4 without edema, clubbing, or cyanosis; (+) Fissures on Plantar surface of feet  Musculoskeletal:  Strength 5/5 and symmetrical in BUE and BLE  Integument:  No rash on face, forearms, or legs  Neurological:  A&O x4/4; No gross deficits of Visual Acuity, Eye Movement, Jaw Opening, Facial Expression, Hearing, Phonation, or Head Movement; No gross deficits of Tongue Movement or Slurring of Speech  Psychiatric:  Affect is appropriate; Language is present and fluent; Behavior is appropriate    Assessment:     Hospital Problems  Date Reviewed: 4/16/2020          Codes Class Noted POA    Lower obstructive uropathy ICD-10-CM: N13.9  ICD-9-CM: 599.60  4/16/2020 Yes        Stage 3 acute kidney injury ([facility] [address] ICD-10-CM: N17.9  ICD-9-CM: 584.9  4/16/2020 Yes        Hypertensive urgency ICD-10-CM: I16.0  ICD-9-CM: 401.9  4/16/2020 Yes              Plan:     IV Ceftriaxone prophylaxis started in [facility] ER by [facility] ER Physician. Patient is currently awaiting placement of Parker Catheter (or otherwise urinary catheter) for evacuation of his bladder. Subsequently, Patient will have an Abdominal CT performed. Please follow-up on these results. Plan is to start IV fluids on Patient once Bladder is evacuated and monitor Creatinine for improvement.   If Patient's Creatinine does not improve as expected, plan to check Fractional Excretion of Sodium and consult Nephrological services. Continue home medications for BPH, HLD, HTN, and Seasonal Allergies. HOLD Benazepril, HCTZ, and Metformin. POC Glucose checks qACHS with Corrective Insulin. DVT mechanoprophylaxis.     Signed By: Coleta Apgar, DO     2020

## 2020-04-16 NOTE — ED PROVIDER NOTES
Jena Herrera is a 76 y.o. male with history of BPH and difficulty urinating for the last week. He is only able to urinate a small amount of time with hematuria. His lower abdomen feels distended. He has no fever, chills, sweats. No vomiting or diarrhea. He has been compliant with his Avodart and Flomax. Symptoms are worse with attempted urination. He has not had to get a Parker catheter placed in approximately 3 years    The history is provided by the patient and medical records. Past Medical History:   Diagnosis Date    Benign non-nodular prostatic hyperplasia with lower urinary tract symptoms     Benign prostatic hyperplasia with lower urinary tract symptoms     BPH (benign prostatic hypertrophy)     BPH with obstruction/lower urinary tract symptoms     Cerebral artery occlusion with cerebral infarction (HCC)     CKD (chronic kidney disease)     stage I    Complex renal cyst     Diabetes mellitus (HCC)     insulin dependent type 2, with stage 1 CKD    Difficulty urinating     Dyslipidemia associated with type 2 diabetes mellitus (HCC)     Enlarged prostate     Gross hematuria     Hematuria     unspec.      Hypertension     Hypertension associated with diabetes ([address]     Perennial allergic rhinitis     Renal cyst     Retention of urine, unspecified     Urinary frequency        Past Surgical History:   Procedure Laterality Date    HX APPENDECTOMY  1956    HX UROLOGICAL  10/01/08    [facility], Cysto-Clot evacuation & Fulguration of prostate,  [employer]         Family History:   Problem Relation Age of Onset    Diabetes Sister     Hypertension Other     Heart Disease Father     Heart Failure Father     Dementia Maternal Grandmother     Heart Disease Maternal Grandfather     Cancer Sister        Social History     Socioeconomic History    Marital status:      Spouse name: Not on file    Number of children: Not on file    Years of education: Not on file    Highest education level: Not on file   Occupational History    Not on file   Social Needs    Financial resource strain: Not on file    Food insecurity     Worry: Not on file     Inability: Not on file    Transportation needs     Medical: Not on file     Non-medical: Not on file   Tobacco Use    Smoking status: Never Smoker    Smokeless tobacco: Never Used   Substance and Sexual Activity    Alcohol use: Yes     Alcohol/week: 0.0 standard drinks     Comment: occ    Drug use: No    Sexual activity: Not Currently   Lifestyle    Physical activity     Days per week: Not on file     Minutes per session: Not on file    Stress: Not on file   Relationships    Social connections     Talks on phone: Not on file     Gets together: Not on file     Attends Yazdanism service: Not on file     Active member of club or organization: Not on file     Attends meetings of clubs or organizations: Not on file     Relationship status: Not on file    Intimate partner violence     Fear of current or ex partner: Not on file     Emotionally abused: Not on file     Physically abused: Not on file     Forced sexual activity: Not on file   Other Topics Concern    Not on file   Social History Narrative    Not on file         ALLERGIES: Seasonale [levonorgestrel-ethinyl estrad]    Review of Systems   Constitutional: Negative for fever. HENT: Negative for sore throat. Eyes: Negative for visual disturbance. Respiratory: Negative for shortness of breath. Cardiovascular: Negative for chest pain. Gastrointestinal: Positive for abdominal distention and abdominal pain. Genitourinary: Positive for difficulty urinating, dysuria, frequency, hematuria and urgency. Negative for decreased urine volume, discharge, penile swelling, scrotal swelling and testicular pain. Musculoskeletal: Negative for gait problem. Skin: Negative for rash. Allergic/Immunologic: Negative for immunocompromised state. Neurological: Negative for syncope. Psychiatric/Behavioral: Positive for sleep disturbance. Vitals:    04/16/20 0242   BP: (!) 178/102   Pulse: 66   Resp: 17   Temp: 98.1 °F (36.7 °C)   SpO2: 98%   Weight: 82.6 kg (182 lb)   Height: 5' 9\" (1.753 m)            Physical Exam  Vitals signs and nursing note reviewed. Constitutional:       General: He is not in acute distress. Appearance: He is not ill-appearing, toxic-appearing or diaphoretic. HENT:      Head: Normocephalic and atraumatic. Right Ear: External ear normal.      Left Ear: External ear normal.      Nose: Nose normal.      Mouth/Throat:      Pharynx: No oropharyngeal exudate. Eyes:      Conjunctiva/sclera: Conjunctivae normal.   Neck:      Musculoskeletal: Normal range of motion. Cardiovascular:      Rate and Rhythm: Normal rate and regular rhythm. Heart sounds: Normal heart sounds. Pulmonary:      Effort: Pulmonary effort is normal. No respiratory distress. Breath sounds: Normal breath sounds. Abdominal:      General: There is distension. Palpations: Abdomen is soft. Tenderness: There is abdominal tenderness. Genitourinary:     Penis: Normal.    Musculoskeletal: Normal range of motion. Skin:     General: Skin is warm and dry. Capillary Refill: Capillary refill takes less than 2 seconds. Neurological:      Mental Status: He is alert and oriented to person, place, and time.    Psychiatric:         Behavior: Behavior normal.          MDM       Procedures  Vitals:  Patient Vitals for the past 12 hrs:   Temp Pulse Resp BP SpO2   04/16/20 0242 98.1 °F (36.7 °C) 66 17 (!) 178/102 98 %         Medications ordered:   Medications   lidocaine (URO-JET/ GLYDO) 2 % jelly (has no administration in time range)   cefTRIAXone (ROCEPHIN) 1 g in 0.9% sodium chloride (MBP/ADV) 50 mL MBP (has no administration in time range)   lidocaine (URO-JET/ GLYDO) 2 % jelly ( Urethral Given 4/16/20 0246)   ondansetron (ZOFRAN) injection 4 mg (4 mg IntraVENous Given 4/16/20 0510)   morphine injection 4 mg (4 mg IntraVENous Given 4/16/20 0510)         Lab findings:  Recent Results (from the past 12 hour(s))   CBC WITH AUTOMATED DIFF    Collection Time: 04/16/20  4:00 AM   Result Value Ref Range    WBC 10.9 4.6 - 13.2 K/uL    RBC 4.78 4.70 - 5.50 M/uL    HGB 14.8 13.0 - 16.0 g/dL    HCT 43.1 36.0 - 48.0 %    MCV 90.2 74.0 - 97.0 FL    MCH 31.0 24.0 - 34.0 PG    MCHC 34.3 31.0 - 37.0 g/dL    RDW 12.5 11.6 - 14.5 %    PLATELET 296 997 - 712 K/uL    MPV 12.7 (H) 9.2 - 11.8 FL    NEUTROPHILS 81 (H) 40 - 73 %    LYMPHOCYTES 10 (L) 21 - 52 %    MONOCYTES 9 3 - 10 %    EOSINOPHILS 0 0 - 5 %    BASOPHILS 0 0 - 2 %    ABS. NEUTROPHILS 8.8 (H) 1.8 - 8.0 K/UL    ABS. LYMPHOCYTES 1.1 0.9 - 3.6 K/UL    ABS. MONOCYTES 1.0 0.05 - 1.2 K/UL    ABS. EOSINOPHILS 0.0 0.0 - 0.4 K/UL    ABS. BASOPHILS 0.0 0.0 - 0.1 K/UL    DF AUTOMATED     METABOLIC PANEL, COMPREHENSIVE    Collection Time: 04/16/20  4:00 AM   Result Value Ref Range    Sodium 140 136 - 145 mmol/L    Potassium 3.7 3.5 - 5.5 mmol/L    Chloride 104 100 - 111 mmol/L    CO2 23 21 - 32 mmol/L    Anion gap 13 3.0 - 18 mmol/L    Glucose 246 (H) 74 - 99 mg/dL    BUN 44 (H) 7.0 - 18 MG/DL    Creatinine 2.90 (H) 0.6 - 1.3 MG/DL    BUN/Creatinine ratio 15 12 - 20      GFR est AA 26 (L) >60 ml/min/1.73m2    GFR est non-AA 21 (L) >60 ml/min/1.73m2    Calcium 8.9 8.5 - 10.1 MG/DL    Bilirubin, total 1.1 (H) 0.2 - 1.0 MG/DL    ALT (SGPT) 36 16 - 61 U/L    AST (SGOT) 33 10 - 38 U/L    Alk. phosphatase 85 45 - 117 U/L    Protein, total 7.5 6.4 - 8.2 g/dL    Albumin 3.8 3.4 - 5.0 g/dL    Globulin 3.7 2.0 - 4.0 g/dL    A-G Ratio 1.0 0.8 - 1.7         EKG interpretation by ED Physician:      X-Ray, CT or other radiology findings or impressions:  CT ABD PELV WO CONT    (Results Pending)       Progress notes, Consult notes or additional Procedure notes:   Multiple unsuccessful attempts to place Parker.   Discussed with  on-call for urology who will come and attempt to Place Hinson. Urology cart obtained from OR    Given patient's acute kidney injury he will also need medical admission for further monitoring of his kidney function. We will also get CT once urology has completed with the catheter placement. Discussed with Dr. Mando Cornejo who is on-call for the hospitalist service who will admit    5:46 AM  D/w urologist again who will be in soon and requests to wait on getting ct until after hinson placed    Reevaluation of patient:   stable    Disposition:  Diagnosis:   1. Acute urinary retention    2. DANIEL (acute kidney injury) ([facility] [address]        Disposition: Admit    Follow-up Information    None           Patient's Medications   Start Taking    No medications on file   Continue Taking    AMLODIPINE (NORVASC) 10 MG TABLET    TAKE 1 TABLET BY MOUTH ONCE A DAY    ASPIRIN 81 MG TABLET    Take 81 mg by mouth daily. ATORVASTATIN (LIPITOR) 20 MG TABLET        BENAZEPRIL (LOTENSIN) 40 MG TABLET    TAKE ONE TABLET BY MOUTH ONCE A DAY    DOCOSAHEXANOIC ACID/EPA (FISH OIL PO)    Take 1 Tab by mouth daily. 1200 mg with 360 mg omega-3    DUTASTERIDE (AVODART) 0.5 MG CAPSULE    TAKE 1 CAPSULE BY MOUTH DAILY    GLIPIZIDE (GLUCOTROL) 10 MG TABLET    10 mg. HYDROCHLOROTHIAZIDE (HYDRODIURIL) 25 MG TABLET    TAKE 1 TABLET BY MOUTH ONCE A DAY. METFORMIN (GLUCOPHAGE) 850 MG TABLET    TAKE ONE TABLET BY MOUTH TWICE DAILY WITH MEALS    MOMETASONE (NASONEX) 50 MCG/ACTUATION NASAL SPRAY    2 Sprays daily. NEBIVOLOL (BYSTOLIC) 10 MG TABLET    TAKE 2 TABLETS BY MOUTH ONCE A DAY. OMEPRAZOLE (PRILOSEC) 10 MG CAPSULE    Take 10 mg by mouth as needed. TAMSULOSIN (FLOMAX) 0.4 MG CAPSULE    TAKE TWO CAPSULES BY MOUTH DAILY AFTER DINNER   These Medications have changed    No medications on file   Stop Taking    CYCLOBENZAPRINE (FLEXERIL) 5 MG TABLET    Take 1 Tab by mouth three (3) times daily as needed for Muscle Spasm(s) for up to 12 doses.     NAPROXEN SODIUM (ALEVE) 220 MG CAP    Take  by mouth.

## 2020-04-16 NOTE — DIABETES MGMT
Diabetes Patient/Family Education Record  Factors That  May Influence Patients Ability  to Learn or  Comply with Recommendations   []   Language barrier    []   Cultural needs   [x]   Motivation    []   Cognitive limitation    []   Physical   [x]   Education    []   Physiological factors   []   Hearing/vision/speaking impairment   []   Nondenominational beliefs    []   Financial factors   []  Other:   []  No factors identified at this time.      Person Instructed:   [x]   Patient   []   Family   []  Other     Preference for Learning:   [x]   Verbal   []   Written   []  Demonstration     Level of Comprehension & Competence:    []  Good                                      [x] Fair                                     []  Poor                             [x]  Needs Reinforcement   []  Teachback completed    Education Component:   [x]  Medication management, including how to administer insulin (if appropriate) and potential medication interactions    [x]  Nutritional management -obtain usual meal pattern   []  Exercise   []  Signs, symptoms, and treatment of hyperglycemia and hypoglycemia   [x] Prevention, recognition and treatment of hyperglycemia and hypoglycemia   [x]  Importance of blood glucose monitoring and how to obtain a blood glucose meter    []  Instruction on use of the blood glucose meter   [x]  Discuss the importance of HbA1C monitoring    []  Sick day guidelines   []  Proper use and disposal of lancets, needles, syringes or insulin pens (if appropriate)   [x]  Potential long-term complications (retinopathy, kidney disease, neuropathy, foot care)   [] Information about whom to contact in case of emergency or for more information    [x]  Goal:  Patient/family will demonstrate understanding of Diabetes Self Management Skills by: (date) _4/26/20______  Plan for post-discharge education or self-management support:    [x] Outpatient class schedule provided            [] Patient Declined    [] Scheduled for outpatient classes (date) _______  Verify:  Does patient understand how diabetes medications work? ___________no_________________  Does patient know what their most recent A1c is? __________8.4%_________________________  Does patient monitor glucose at home? ____no_______________________________________  Does patient have difficulty obtaining diabetes medications or testing supplies?  ___no______________

## 2020-04-16 NOTE — PROGRESS NOTES
attempted to complete the initial Spiritual Assessment of the patient in bed 8 of the emergency room and offer Pastoral Care support to the patient but found him resting peacefully at this time and chose not to disturb him at this time. Patient does not have any Mosque/cultural needs that will affect patients preferences in health care. Chaplains will continue to follow and will provide pastoral care on an as needed/requested basis.     [facility]  Spiritual Care Department  [phone]

## 2020-04-16 NOTE — PROGRESS NOTES
Reason for Admission:   Lower obstructive uropathy; Stage 3 acute kidney injury               RUR Score:     28    PCP: First and Last name: Marleny May   Name of Practice: [facility]   Are you a current patient: Yes/No: Yes   Approximate date of last visit: 1/6/20             Resources/supports as identified by patient/family: Patient states that he has a room mate that is available to assists. Top Challenges facing patient (as identified by patient/family and CM): Finances/Medication cost?                    Transportation? Support system or lack thereof? Living arrangements? Self-care/ADLs/Cognition? Current Advanced Directive/Advance Care Plan:  N/A                          Plan for utilizing home health:    TBD                 Transition of Care Plan:       Home vs Home Health    Initial assessment completed with pt. Pt verified/ confirmed demographic and insurance data. Prior to admission pt lived with a friend/ room mate in a 1 story home with 1 step to enter in the front of the home and 6 steps to enter the back of the residence. Pt was independent with ambulation and ADL's. Pt denies any prior home care services or DME. Pt uses [facility] [place] [facility] to fill his prescriptions. Discussed with pt the role of care management and transition plans. Pt denies having any needs at this time and stated that his room mate can help him as he does not work and is available as needed. Educated pt on the role of care giver in the home vs home health related to skilled nursing and/ or PT & OT. Pt stated that he would consider home health if recommended at discharge. I also confirmed with the pt that he is agreeable to his sister, Aaron Rivera ([phone]) participating in his care. Care Management will continue to follow.        Yue Negron, MSN, RN, DENIS-RN   ED Outcomes Manager  [phone] (phone)

## 2020-04-16 NOTE — ED NOTES
6:59 AM d/w patient's nurse, Dr Krishna Sheikh hospitalist, patient, nurse supervisor Sara, have not seen urology yet. Sara will look into it.

## 2020-04-16 NOTE — DIABETES MGMT
NUTRITIONAL ASSESSMENT GLYCEMIC CONTROL/ PLAN OF CARE     Dieter Sin           76 y.o.           4/16/2020                 1. Acute urinary retention    2. DANIEL (acute kidney injury) ([address]    DM     INTERVENTIONS/PLAN:   Recommend adding Lantus 10  units q 24 hrs. Diabetes Self-Management Education   Added Diabetic diet to current diet order. ASSESSMENT:   Nutritional Status:  Based on stated weight, Pt is overweight related to excess caloric intake as evidenced by 114% ideal weight and BMI=26.9kg/m2. Altered nutrition related lab values r/t diabetes AEB  and 286, A1 c8.4%. Altered nutrition-related lab values  Rt DX DANIEL   as evidenced by CR 2.9 BUN 44  GFR 21. Diabetes Management:   Pt admits that he doesn't always take his oral DM meds as prescribed  Recent blood glucose:   246-286   Within target range (non-  ICU: <140; ICU<180): [] Yes   [x]  No  Current Insulin regimen: corrective lispro  Home medication/insulin regimen: (verified with pt). glipiZIDE (GLUCOTROL) 10 mg tablet 10 mg.    metFORMIN (GLUCOPHAGE) 850 mg tablet TAKE ONE TABLET BY MOUTH TWICE DAILY WITH MEALS      HbA1c: 8.4% equivalent  to ave Blood Glucose of 192mg/dl for 2-3 months prior to admission    Adequate glycemic control PTA:  [] Yes  [x] No     SUBJECTIVE/OBJECTIVE:   Information obtained from:Pt stated that he thinks \"the diabetes pills may give him diabetes. \"  Diet: Cardiac 1800 calorie Diabetic  No data found.   Medications: [x]                Reviewed    Labs:   No results found for: HBA1C, HGBE8, ILZ4EQEZ  Lab Results   Component Value Date/Time    Sodium 140 04/16/2020 04:00 AM    Potassium 3.7 04/16/2020 04:00 AM    Chloride 104 04/16/2020 04:00 AM    CO2 23 04/16/2020 04:00 AM    Anion gap 13 04/16/2020 04:00 AM    Glucose 246 (H) 04/16/2020 04:00 AM    BUN 44 (H) 04/16/2020 04:00 AM    Creatinine 2.90 (H) 04/16/2020 04:00 AM    Calcium 8.9 04/16/2020 04:00 AM    Albumin 3.8 04/16/2020 04:00 AM     Anthropometrics: IBW : 72.6 kg (160 lb), % IBW (Calculated): 113.75 %, BMI (calculated): 26.9  Wt Readings from Last 1 Encounters:   04/16/20 82.6 kg (182 lb)      Ht Readings from Last 1 Encounters:   04/16/20 5' 9\" (1.753 m)     Estimated Nutrition Needs:  2050 Kcals/day, Protein (g): 80 g Fluid (ml): 2000 ml  Based on:   [x]          Stated BW    []          ABW   []            Adjusted BW    Nutrition Diagnoses:      Overweight and not taking DM meds as prescribed. Nutrition Interventions: insulin increase, diet modification and DM education. Goal:   Blood glucose will be within target range of  mg/dL by 4/19/20. At dc pt will take diabetes medications as prescribed.      Nutrition Monitoring and Evaluation      []     Monitor po intake on meal rounds  [x]     Continue inpatient monitoring and intervention  []     Other:      Nutrition Risk:  []   High     []  Moderate    [x]  Minimal/Uncompromised    Vandana Menjivar RD  pgr [phone]

## 2020-04-16 NOTE — PROGRESS NOTES
TRANSFER - IN REPORT:    2996- Verbal report received from Issac Mujica RN(name) on Richie Cheatham  being received from ED(unit) for routine progression of care      Report consisted of patients Situation, Background, Assessment and   Recommendations(SBAR). Information from the following report(s) SBAR, Kardex, Intake/Output, MAR and Recent Results was reviewed with the receiving nurse. Opportunity for questions and clarification was provided. Informed by the ED nurse that pt will be brought later to the unit from ED. Pt is waiting for the urologist to come at ED and do the procedure of inserting the hinson. Pt has distended bladder with severe urine retention. Bladder scan done 999 ml. ED team tried several times with different catheters including coude but unsuccessful. Pt given morphine 4 mg iv at ED for pain. Creatine 2.90 and BUN 44  0724-Report given to Viry Yeager RN oncoming nurse.

## 2020-04-16 NOTE — ED NOTES
TRANSFER - ED to INPATIENT REPORT:    Verbal report given to Sister ESTHER on Jena Herrera  being transferred to [facility] for routine progression of care       Report consisted of patients Situation, Background, Assessment and   Recommendations(SBAR). SBAR report made available to receiving floor on this patient being transferred to [address] [place] [address])  for routine progression of care       Admitting diagnosis Lower obstructive uropathy [N13.9]  Stage 3 acute kidney injury ([facility] [address] [N17.9]    Information from the following report(s) ED Summary, Procedure Summary, MAR and Recent Results was made available to receiving floor. Lines:   Peripheral IV 04/16/20 Left Antecubital (Active)        HCG Status for ALL Females under 55 y/o: NO     Medication list unable to confirm    Opportunity for questions and clarification was provided. Patient is aware of  time, place, person and situation : No infusions running. Patient is  continent and ambulatory without assist     Valuables will be transported with patient     Patient will be transported with:   ED staff. MAP (Monitor): 83 =Monitored (most recent)  Vitals w/ MEWS Score (last day)     Date/Time MEWS Score Pulse Resp Temp BP Level of Consciousness SpO2    04/16/20 0530  --  --  --  --  144/62  --  94 %    04/16/20 0242  1  66  17  98.1 °F (36.7 °C)  (!) 178/102  Alert  98 %              Septic Patients:     Lactic Acid  No results found for: LACPOC (Most recent on top)  Repeat drawn: NO Time: NO     ALL LACTIC ACIDS GREATER THAN 2 MUST BE REPEATED POC WITHIN 4 HOURS OR PRIOR TO ADMISSION               Total Fluid Bolus initiated and documented on MAR: NO    All ordered antibiotics initiated within first 3 hours of TIME ZERO?   NO, N/A

## 2020-04-16 NOTE — CONSULTS
Consult Note    Patient: Jr Huggins               Sex: male          DOA: 4/16/2020       YOB: 1945      Age:  76 y.o.        LOS:  LOS: 0 days              Referring Provider: Dr. Stanley Quiñones:   3. 77 y/o male with BPH, urinary retention, acute renal failure. Difficult catheterization. Bedside cystoscopy performed catheter placed over a wire 1.5 L drained. PLAN:    Recommend   1. Continue on Avodart and Flomax. Keep Parker x 1 week. Non con CT abdomen/pelvis ordered. Will follow up with Dr. Isabell Romero for void trial and/or consideration of BPH surgery. Chantel Padilla MD  [phone] Office  [phone]  Pager    Chief Complaint   Patient presents with    Urinary Retention       HISTORY OF PRESENT ILLNESS:  Jr Huggins is a 76 y.o. male who is seen in consultation as referred by Dr. Víctor Sequeira for urinary retention and difficult Parker. He has a history of BPH on Flomax and Avodart, followed by Dr. Isabell Romero, last seen in 2018, prostate volume at that time 168 grams, had prior cystoscopy and clot evacuation. Has had urinary issues the past two weeks, weakened stream, some dysuria, incomplete emptying. Came into the ER late last night with inability to void. Catheterization attempts made in the ER were unsuccessful. Labs showed creatinine of 2.90 with prior baseline of 0.9. AUA Symptom Score 11/6/2017   Over the past month how often have you had the sensation that your bladder was not completely empty after you finished urinating? 1   Over the past month, how often have had to urinate again less than 2 hours after you last finished urinating? 1   Over the past month, how often have you found you stopped and started again several times when you urinated? 1   Over the past month, how often have you found it difficult to postpone urination?  0   Over the past month, how often have you had a weak urinary stream? 2   Over the past month, how often have you had to push or strain to begin urinating? 1   Over the past month, how many times did you most typically get up to urinate from the time you went to bed at night until the time you got up in the morning? 3   AUA Score 9   If you were to spend the rest of your life with your urinary condition the way it is now, how would you feel about that? Pleased       Past Medical History:   Diagnosis Date    BPH with obstruction/lower urinary tract symptoms     S/P TURP    Cerebral artery occlusion with cerebral infarction [facility])     Patient states his PCP diagnosed him \"just by looking at [him]\" and then started him on Aspirin    CKD (chronic kidney disease)     stage I    Diabetes mellitus ([facility] [address]     insulin dependent type 2, with stage 1 CKD    Hematuria     unspec.  HLD (hyperlipidemia)     HTN (hypertension)     Hypertension     Perennial allergic rhinitis     Retention of urine, unspecified        Past Surgical History:   Procedure Laterality Date    CYSTOSCOPY,INSERT URETERAL STENT  11/28/2017    No Stent    HX APPENDECTOMY  1956    HX OTHER SURGICAL Left 07/02/2017    PICC Line Placement    HX OTHER SURGICAL  02/17/2017    CHG Catheter Balloon    HX TURP  2007    HX UROLOGICAL  10/01/08    [facility], Cysto-Clot evacuation & Fulguration of prostate, Dr Mederos Morning History     Tobacco Use    Smoking status: Never Smoker    Smokeless tobacco: Never Used   Substance Use Topics    Alcohol use:  Yes     Alcohol/week: 0.0 standard drinks     Comment: occ    Drug use: No       Allergies   Allergen Reactions    Seasonale [Levonorgestrel-Ethinyl Estrad] Other (comments)     Nasal symptoms  Nasal symptoms       Family History   Problem Relation Age of Onset    Diabetes Sister     Hypertension Other     Heart Disease Father     Heart Failure Father     Dementia Maternal Grandmother     Heart Disease Maternal Grandfather     Cancer Sister         Stomach Cancer       Current Facility-Administered Medications Medication Dose Route Frequency Provider Last Rate Last Dose    cefTRIAXone (ROCEPHIN) 1 g in 0.9% sodium chloride (MBP/ADV) 50 mL MBP  1 g IntraVENous Q24H Lucy Mayfield MD   Stopped at 04/16/20 0524    0.9% sodium chloride infusion  125 mL/hr IntraVENous CONTINUOUS Jeanette Amaya DO         Current Outpatient Medications   Medication Sig Dispense Refill    dutasteride (AVODART) 0.5 mg capsule TAKE 1 CAPSULE BY MOUTH DAILY 90 Cap 2    tamsulosin (FLOMAX) 0.4 mg capsule TAKE TWO CAPSULES BY MOUTH DAILY AFTER DINNER 180 Cap 2    atorvastatin (LIPITOR) 20 mg tablet   3    amLODIPine (NORVASC) 10 mg tablet TAKE 1 TABLET BY MOUTH ONCE A DAY      benazepril (LOTENSIN) 40 mg tablet TAKE ONE TABLET BY MOUTH ONCE A DAY      glipiZIDE (GLUCOTROL) 10 mg tablet 10 mg.      hydroCHLOROthiazide (HYDRODIURIL) 25 mg tablet TAKE 1 TABLET BY MOUTH ONCE A DAY.  metFORMIN (GLUCOPHAGE) 850 mg tablet TAKE ONE TABLET BY MOUTH TWICE DAILY WITH MEALS      nebivolol (BYSTOLIC) 10 mg tablet TAKE 2 TABLETS BY MOUTH ONCE A DAY.  mometasone (NASONEX) 50 mcg/actuation nasal spray 2 Sprays daily.  omeprazole (PRILOSEC) 10 mg capsule Take 10 mg by mouth as needed.  DOCOSAHEXANOIC ACID/EPA (FISH OIL PO) Take 1 Tab by mouth daily. 1200 mg with 360 mg omega-3      aspirin 81 mg tablet Take 81 mg by mouth daily. Review of Systems  Constitutional: No fever, chills, or weight loss  Respiratory: No dyspnea  Cardiovascular: No chest pain  Gastrointestinal: Positive for lower abdominal pain  Genitourinary: Positive for dysuria and incomplete emptying    Neurological: No focal motor changes. PHYSICAL EXAMINATION:   Visit Vitals  /58   Pulse 66   Temp 98.1 °F (36.7 °C)   Resp 17   Ht 5' 9\" (1.753 m)   Wt 182 lb (82.6 kg)   SpO2 95%   BMI 26.88 kg/m²     Constitutional: Well developed, well nourished male. No acute distress.     HEENT: Normocephalic, Atraumatic, EOM's intact   CV:  Normal radial pulse.  Respiratory: No respiratory distress or difficulties breathing   Abdomen:  Nontender, nondistended.  Male:  No CVA tenderness. Bladder is palpably distended to the level of the umbilicus. SCROTUM:  No scrotal rash or lesions noticed. Normal bilateral testes without masses or tenderness. PENIS: Urethral meatus normal in location and size. No urethral discharge. Skin: No evidence of jaundice. Normal color  Neuro/Psych:  Alert and oriented. Affect appropriate. Lymphatic:   No enlarged inguinal lymph nodes. Procedure Note:  Under sterile conditions, a 17F Flexible cystoscope was passed per urethra. The anterior urethra was normal. The posterior urethra showed large trilobar hypertrophy with visual occlusion of the bladder neck. There was some bleeding within the prostate probably from prior catheter attempts but no stricture or obvious false passage. The scope was passed into the bladder. The urine was cloudy and bladder very distended limited visualization. Guidewire was passed into the bladder and scope was removed. 18F [place] tip catheter passed over the wire and into the bladder, the balloon port instilled with 10 cc of sterile water. About 1.4 L of yellow urine drained and urine was sent for culture. REVIEW OF LABS AND IMAGING:      Labs: Results:   Chemistry    Recent Labs     04/16/20  0400   *      K 3.7      CO2 23   BUN 44*   CREA 2.90*   CA 8.9   AGAP 13   BUCR 15   AP 85   TP 7.5   ALB 3.8   GLOB 3.7   AGRAT 1.0      CBC w/Diff Recent Labs     04/16/20  0400   WBC 10.9   RBC 4.78   HGB 14.8   HCT 43.1      GRANS 81*   LYMPH 10*   EOS 0      Cultures No results for input(s): CULT in the last 72 hours.   All Micro Results     Procedure Component Value Units Date/Time    CULTURE, URINE [[id number]] Collected:  04/16/20 0743    Order Status:  Completed Specimen:  Cath Urine Updated:  04/16/20 2436            Urinalysis Color   Date Value Ref Range Status   05/13/2017 YELLOW   Final     Appearance   Date Value Ref Range Status   05/13/2017 CLEAR   Final     Specific gravity   Date Value Ref Range Status   05/13/2017 1.015 1.005 - 1.030   Final     pH (UA)   Date Value Ref Range Status   05/13/2017 6.5 5.0 - 8.0   Final     Protein   Date Value Ref Range Status   05/13/2017 TRACE (A) NEG mg/dL Final     Ketone   Date Value Ref Range Status   05/13/2017 NEGATIVE  NEG mg/dL Final     Bilirubin   Date Value Ref Range Status   05/13/2017 NEGATIVE  NEG   Final     Blood   Date Value Ref Range Status   05/13/2017 NEGATIVE  NEG   Final     Urobilinogen   Date Value Ref Range Status   05/13/2017 0.2 0.2 - 1.0 EU/dL Final     Nitrites   Date Value Ref Range Status   05/13/2017 NEGATIVE  NEG   Final     Leukocyte Esterase   Date Value Ref Range Status   05/13/2017 NEGATIVE  NEG   Final     Potassium   Date Value Ref Range Status   04/16/2020 3.7 3.5 - 5.5 mmol/L Final     Creatinine   Date Value Ref Range Status   04/16/2020 2.90 (H) 0.6 - 1.3 MG/DL Final     BUN   Date Value Ref Range Status   04/16/2020 44 (H) 7.0 - 18 MG/DL Final     Prostate Specific Ag   Date Value Ref Range Status   09/23/2014 2.14 0.00 - 4.00 ng/mL Final      PSA No results for input(s): PSA in the last 72 hours. Coagulation Lab Results   Component Value Date/Time    INR (POC) 1.2 (H) 07/04/2013 06:37 AM           US Results (most recent):  Results from [place] encounter on 04/26/15   US SCROTUM/TESTICLES    Narrative EXAM:  US SCROTUM/TESTICLES    INDICATION:   r/o torsion    COMPARISON: None. TECHNIQUE:  Real-time sonography of the scrotum was performed with a high frequency linear  transducer. Multiple static images were obtained. Color Doppler evaluation was  also performed. FINDINGS:  The right testis measures 5.0 x 2.9 x 2.3 cm and the left testis measures 4.8 x  2.7 x 2.0 cm. Slight increased left-sided vascularity present however,  appropriate Doppler waveforms.  Tiny cysts within the left testicle measures up  to 0.4 cm.    2 epididymal head cysts are seen on the right, largest measuring up to 0.7 cm in  diameter. A smaller epididymal head cyst on the left measures up to 0.6 cm. Questionable mild scrotal edema. Impression IMPRESSION:  No evidence of testicular torsion. Mild scrotal edema is present. Incidental epididymal head cysts. Preliminary report provided by on-call radiology resident. chest    CT Results (most recent):   Results from Hospital Encounter encounter on 08/17/15   CT NECK SOFT TISSUE W CONT    Narrative CT neck    History: Dysphagia    Comparison: No prior study    Technique: Multiple axial CT images of the neck were obtained extending from the  skull base to the upper thorax after administration of IV contrast. Coronal and  sagittal reformations were performed. Findings:    No mass is identified. No adenopathy is present. There are a few normal sized  anterior cervical chain (level II and III) lymph nodes present. The vessels are  unremarkable. The thyroid gland is unremarkable. Multilevel cervical spondylosis, most pronounced at C5-C6 and to a lesser degree  at C6-C7 with endplate sclerosis and spurring along with probable bilateral  foraminal encroachment at C5-C6. The lung apices are clear. Impression IMPRESSION:    1. Unremarkable CT of the neck without lymphadenopathy or soft tissue mass  lesion. Consider followup esophagram for further assessment of dysphasia. 2. Cervical spondylosis, most pronounced at C5-C6. Preliminary report provided by on-call radiology resident. ABD      MRI Results (most recent): No procedure found. 1. Acute urinary retention    2.  DANIEL (acute kidney injury) ([address]

## 2020-04-16 NOTE — ED NOTES
In the CT, there were one or two drips of blood on the floor. Checked urinary insertion site and pt was not actively bleeding, verified with admitting nurse upstairs.

## 2020-04-16 NOTE — ED NOTES
Bladder scan showing over 999 ml urine residual in bladder. MD notified, will continue to monitor pt. Charge nurse Jian at bedside to attempt hinson insertion.

## 2020-04-16 NOTE — ED NOTES
Urojet administered per MAR, tolerated well by pt. Attempted to insert 16 fr 10 cc hinson without success, then attempted to insert 16 Prydeinig 10 cc coude without success, lastly tried to insert 14 Prydeinig 10 cc coude without success. Small amount of bright red blood noted following cath attempts. MD notified. Pt stable, denies pain, will continue to monitor.

## 2020-04-16 NOTE — ACP (ADVANCE CARE PLANNING)
Called and spoke to patient over phone regarding ACP/AMD. He states that his primary doctor, Dr Tammy Muñoz should have a copy of his AMD and that I may obtain a copy for our records. Called Dr Alissa Clark office [phone] and spoke to Claude. He indicated that they do not have an ACP/AMD document on file and has gone back 5 years. Called patient back in room and advised that Dr Tammy Muñoz does not have a copy of his AMD on file. He stated his current Healthcare Agent is his sister, Ekta Hastings, if he is unable to make decisions. He now states that maybe he didn't give his doctor a copy. Offered patient to complete an AMD while here and he declined. He will get the copy to his PCP once discharged.     Jamey Fuller RN    Outcomes Manager  [phone]  [phone]

## 2020-04-17 VITALS
WEIGHT: 190.8 LBS | RESPIRATION RATE: 20 BRPM | SYSTOLIC BLOOD PRESSURE: 143 MMHG | TEMPERATURE: 98.2 F | BODY MASS INDEX: 28.26 KG/M2 | HEIGHT: 69 IN | DIASTOLIC BLOOD PRESSURE: 66 MMHG | HEART RATE: 60 BPM | OXYGEN SATURATION: 95 %

## 2020-04-17 PROBLEM — N17.9 ACUTE RENAL FAILURE (ARF) (HCC): Status: ACTIVE | Noted: 2020-04-17

## 2020-04-17 LAB
ALBUMIN SERPL-MCNC: 2.8 G/DL (ref 3.4–5)
ALBUMIN/GLOB SERPL: 0.9 {RATIO} (ref 0.8–1.7)
ALP SERPL-CCNC: 65 U/L (ref 45–117)
ALT SERPL-CCNC: 27 U/L (ref 16–61)
ANION GAP SERPL CALC-SCNC: 5 MMOL/L (ref 3–18)
AST SERPL-CCNC: 26 U/L (ref 10–38)
BASOPHILS # BLD: 0 K/UL (ref 0–0.1)
BASOPHILS NFR BLD: 0 % (ref 0–2)
BILIRUB SERPL-MCNC: 0.9 MG/DL (ref 0.2–1)
BUN SERPL-MCNC: 34 MG/DL (ref 7–18)
BUN/CREAT SERPL: 28 (ref 12–20)
CALCIUM SERPL-MCNC: 8.1 MG/DL (ref 8.5–10.1)
CHLORIDE SERPL-SCNC: 108 MMOL/L (ref 100–111)
CO2 SERPL-SCNC: 27 MMOL/L (ref 21–32)
CREAT SERPL-MCNC: 1.2 MG/DL (ref 0.6–1.3)
DIFFERENTIAL METHOD BLD: ABNORMAL
EOSINOPHIL # BLD: 0.1 K/UL (ref 0–0.4)
EOSINOPHIL NFR BLD: 1 % (ref 0–5)
ERYTHROCYTE [DISTWIDTH] IN BLOOD BY AUTOMATED COUNT: 12.8 % (ref 11.6–14.5)
GLOBULIN SER CALC-MCNC: 3 G/DL (ref 2–4)
GLUCOSE BLD STRIP.AUTO-MCNC: 121 MG/DL (ref 70–110)
GLUCOSE BLD STRIP.AUTO-MCNC: 203 MG/DL (ref 70–110)
GLUCOSE SERPL-MCNC: 117 MG/DL (ref 74–99)
HCT VFR BLD AUTO: 37.8 % (ref 36–48)
HGB BLD-MCNC: 12.6 G/DL (ref 13–16)
LYMPHOCYTES # BLD: 1.9 K/UL (ref 0.9–3.6)
LYMPHOCYTES NFR BLD: 18 % (ref 21–52)
MCH RBC QN AUTO: 30.8 PG (ref 24–34)
MCHC RBC AUTO-ENTMCNC: 33.3 G/DL (ref 31–37)
MCV RBC AUTO: 92.4 FL (ref 74–97)
MONOCYTES # BLD: 1.2 K/UL (ref 0.05–1.2)
MONOCYTES NFR BLD: 12 % (ref 3–10)
NEUTS SEG # BLD: 7.2 K/UL (ref 1.8–8)
NEUTS SEG NFR BLD: 69 % (ref 40–73)
PLATELET # BLD AUTO: 162 K/UL (ref 135–420)
PMV BLD AUTO: 12.7 FL (ref 9.2–11.8)
POTASSIUM SERPL-SCNC: 3.9 MMOL/L (ref 3.5–5.5)
PROT SERPL-MCNC: 5.8 G/DL (ref 6.4–8.2)
RBC # BLD AUTO: 4.09 M/UL (ref 4.7–5.5)
SODIUM SERPL-SCNC: 140 MMOL/L (ref 136–145)
WBC # BLD AUTO: 10.4 K/UL (ref 4.6–13.2)

## 2020-04-17 PROCEDURE — 96366 THER/PROPH/DIAG IV INF ADDON: CPT

## 2020-04-17 PROCEDURE — 80053 COMPREHEN METABOLIC PANEL: CPT

## 2020-04-17 PROCEDURE — 77030040831 HC BAG URINE DRNG MDII -A

## 2020-04-17 PROCEDURE — 85025 COMPLETE CBC W/AUTO DIFF WBC: CPT

## 2020-04-17 PROCEDURE — 82962 GLUCOSE BLOOD TEST: CPT

## 2020-04-17 PROCEDURE — 74011250636 HC RX REV CODE- 250/636: Performed by: EMERGENCY MEDICINE

## 2020-04-17 PROCEDURE — 65390000012 HC CONDITION CODE 44 OBSERVATION

## 2020-04-17 PROCEDURE — 74011000258 HC RX REV CODE- 258: Performed by: EMERGENCY MEDICINE

## 2020-04-17 PROCEDURE — 74011250636 HC RX REV CODE- 250/636: Performed by: INTERNAL MEDICINE

## 2020-04-17 PROCEDURE — 74011636637 HC RX REV CODE- 636/637: Performed by: HOSPITALIST

## 2020-04-17 PROCEDURE — 36415 COLL VENOUS BLD VENIPUNCTURE: CPT

## 2020-04-17 PROCEDURE — 99218 HC RM OBSERVATION: CPT

## 2020-04-17 PROCEDURE — 77030040361 HC SLV COMPR DVT MDII -B

## 2020-04-17 PROCEDURE — 74011250637 HC RX REV CODE- 250/637: Performed by: INTERNAL MEDICINE

## 2020-04-17 RX ORDER — CEPHALEXIN 500 MG/1
500 CAPSULE ORAL 4 TIMES DAILY
Qty: 20 CAP | Refills: 0 | Status: SHIPPED | OUTPATIENT
Start: 2020-04-17 | End: 2020-04-22

## 2020-04-17 RX ADMIN — TAMSULOSIN HYDROCHLORIDE 0.8 MG: 0.4 CAPSULE ORAL at 08:19

## 2020-04-17 RX ADMIN — CEFTRIAXONE 1 G: 1 INJECTION, POWDER, FOR SOLUTION INTRAMUSCULAR; INTRAVENOUS at 05:25

## 2020-04-17 RX ADMIN — NEBIVOLOL HYDROCHLORIDE 20 MG: 5 TABLET ORAL at 08:18

## 2020-04-17 RX ADMIN — ASPIRIN 81 MG: 81 TABLET, COATED ORAL at 08:18

## 2020-04-17 RX ADMIN — INSULIN GLARGINE 10 UNITS: 100 INJECTION, SOLUTION SUBCUTANEOUS at 08:31

## 2020-04-17 RX ADMIN — ATORVASTATIN CALCIUM 20 MG: 20 TABLET, FILM COATED ORAL at 08:18

## 2020-04-17 RX ADMIN — INSULIN LISPRO 6 UNITS: 100 INJECTION, SOLUTION INTRAVENOUS; SUBCUTANEOUS at 12:02

## 2020-04-17 RX ADMIN — DUTASTERIDE 0.5 MG: 0.5 CAPSULE, LIQUID FILLED ORAL at 09:00

## 2020-04-17 RX ADMIN — SODIUM CHLORIDE 125 ML/HR: 900 INJECTION, SOLUTION INTRAVENOUS at 08:25

## 2020-04-17 RX ADMIN — AMLODIPINE BESYLATE 10 MG: 10 TABLET ORAL at 08:18

## 2020-04-17 NOTE — PROGRESS NOTES
Chart reviewed. Plan remains home when medically stable. Will cont to follow for any needs. Lilli Paredes RN,ext [phone].

## 2020-04-17 NOTE — PROGRESS NOTES
0718--Bedside and Verbal shift change report given to Scot (oncoming nurse) by Nakia Yanes (offgoing nurse). Report included the following information SBAR, Kardex, Intake/Output and MAR    0900-- Am medication administered. Pt in bed, no distress noted. Will continue to monitor. 1230--pm medication administered. Pt tolerated well. 1250--went over discharge instruction with patient. Pt verbalized understanding of D/C instruction. All question pertaining to D/C instruction answered. 1300-- PVL taken out.

## 2020-04-17 NOTE — DISCHARGE INSTRUCTIONS
MyChart Activation    Thank you for enrolling in [address] Please follow the instructions below to securely access your online medical record. [facility] allows you to send messages to your doctor, view your test results, renew your prescriptions, schedule appointments, and more. How Do I Sign Up? 1. In your internet browser, go to [email or web address]. 2. Click on the First Time User? Click Here link in the Sign In box. You will see the New Member Sign Up page. 3. Enter your [employer] Access Code exactly as it appears below. You will not need to use this code after youve completed the sign-up process. If you do not sign up before the expiration date, you must request a new code. [employer] Access Code: [email or web address]  Expires: 5/31/2020  8:05 AM     4. Enter the last four digits of your Social Security Number (xxxx) and Date of Birth (mm/dd/yyyy) as indicated and click Submit. You will be taken to the next sign-up page. 5. Create a [facility] ID. This will be your [facility] login ID and cannot be changed, so think of one that is secure and easy to remember. 6. Create a [employer] password. You can change your password at any time. 7. Enter your Password Reset Question and Answer. This can be used at a later time if you forget your password. 8. Enter your e-mail address. You will receive e-mail notification when new information is available in [address] 9. Click Sign Up. You can now view your medical record. Additional Information    Remember, [employer] is NOT to be used for urgent needs. For medical emergencies, dial 911. Now available from your iPhone and Android!   DISCHARGE SUMMARY from Nurse    PATIENT INSTRUCTIONS:    After general anesthesia or intravenous sedation, for 24 hours or while taking prescription Narcotics:  · Limit your activities  · Do not drive and operate hazardous machinery  · Do not make important personal or business decisions  · Do  not drink alcoholic beverages  · If you have not urinated within 8 hours after discharge, please contact your surgeon on call. Report the following to your surgeon:  · Excessive pain, swelling, redness or odor of or around the surgical area  · Temperature over 100.5  · Nausea and vomiting lasting longer than 4 hours or if unable to take medications  · Any signs of decreased circulation or nerve impairment to extremity: change in color, persistent  numbness, tingling, coldness or increase pain  · Any questions    What to do at Home:  Recommended activity: Activity as tolerated. If you experience any of the following symptoms fever, nausea, vomiting,please follow up with Primary care physician/ ED    *  Please give a list of your current medications to your Primary Care Provider. *  Please update this list whenever your medications are discontinued, doses are      changed, or new medications (including over-the-counter products) are added. *  Please carry medication information at all times in case of emergency situations. These are general instructions for a healthy lifestyle:    No smoking/ No tobacco products/ Avoid exposure to second hand smoke  Surgeon General's Warning:  Quitting smoking now greatly reduces serious risk to your health. Obesity, smoking, and sedentary lifestyle greatly increases your risk for illness    A healthy diet, regular physical exercise & weight monitoring are important for maintaining a healthy lifestyle    You may be retaining fluid if you have a history of heart failure or if you experience any of the following symptoms:  Weight gain of 3 pounds or more overnight or 5 pounds in a week, increased swelling in our hands or feet or shortness of breath while lying flat in bed. Please call your doctor as soon as you notice any of these symptoms; do not wait until your next office visit. The discharge information has been reviewed with the patient.   The patient verbalized understanding. Discharge medications reviewed with the patient and appropriate educational materials and side effects teaching were provided. ___________________________________________________________________________________________________________________________________    Patient Education        Acute High Blood Pressure: Care Instructions  Your Care Instructions    Acute high blood pressure is very high blood pressure. It's a serious problem. Very high blood pressure can damage your brain, heart, eyes, and kidneys. You may have been given medicines to lower your blood pressure. You may have gotten them as pills or through a needle in one of your veins. This is called an IV. And maybe you were given other medicines too. These can be needed when high blood pressure causes other problems. To keep your blood pressure at a lower level, you may need to make healthy lifestyle changes. And you will probably need to take medicines. Be sure to follow up with your doctor about your blood pressure and what you can do about it. Follow-up care is a key part of your treatment and safety. Be sure to make and go to all appointments, and call your doctor if you are having problems. It's also a good idea to know your test results and keep a list of the medicines you take. How can you care for yourself at home? · See your doctor as often as he or she recommends. This is to make sure your blood pressure is under control. You will probably go at least 2 times a year. But it may be more often at first.  · Take your blood pressure medicine exactly as prescribed. You may take one or more types. They include diuretics, beta-blockers, ACE inhibitors, calcium channel blockers, and angiotensin II receptor blockers. Call your doctor if you think you are having a problem with your medicine. · If you take blood pressure medicine, talk to your doctor before you take decongestants or anti-inflammatory medicine, such as ibuprofen. These can raise blood pressure. · Learn how to check your blood pressure at home. Check it often. · Ask your doctor if it's okay to drink alcohol. · Talk to your doctor about lifestyle changes that can help blood pressure. These include being active and managing your weight. · Don't smoke. Smoking increases your risk for heart attack and stroke. When should you call for help? Call  911 anytime you think you may need emergency care. This may mean having symptoms that suggest that your blood pressure is causing a serious heart or blood vessel problem. Your blood pressure may be over 180/120.   For example, call  911 if:    · You have symptoms of a heart attack. These may include:  ? Chest pain or pressure, or a strange feeling in the chest.  ? Sweating. ? Shortness of breath. ? Nausea or vomiting. ? Pain, pressure, or a strange feeling in the back, neck, jaw, or upper belly or in one or both shoulders or arms. ? Lightheadedness or sudden weakness. ? A fast or irregular heartbeat.     · You have symptoms of a stroke. These may include:  ? Sudden numbness, tingling, weakness, or loss of movement in your face, arm, or leg, especially on only one side of your body. ? Sudden vision changes. ? Sudden trouble speaking. ? Sudden confusion or trouble understanding simple statements. ? Sudden problems with walking or balance. ? A sudden, severe headache that is different from past headaches.     · You have severe back or belly pain.    Do not wait until your blood pressure comes down on its own.  Get help right away.   Call your doctor now or seek immediate care if:    · Your blood pressure is much higher than normal (such as 180/120 or higher), but you don't have symptoms.     · You think high blood pressure is causing symptoms, such as:  ? Severe headache.  ? Blurry vision.    Watch closely for changes in your health, and be sure to contact your doctor if:    · Your blood pressure measures higher than your doctor recommends at least 2 times. That means the top number is higher or the bottom number is higher, or both.     · You think you may be having side effects from your blood pressure medicine. Where can you learn more? Go to http://ifrah-hayden.info/  Enter H919 in the search box to learn more about \"Acute High Blood Pressure: Care Instructions. \"  Current as of: December 15, 2019Content Version: 12.4  © 9372-6451 [employer]. Care instructions adapted under license by [employer] (which disclaims liability or warranty for this information). If you have questions about a medical condition or this instruction, always ask your healthcare professional. [place] [address] any warranty or liability for your use of this information. Patient Education        Urinary Retention: Care Instructions  Your Care Instructions    Urinary retention means that you aren't able to urinate. In men, it is often caused by a blockage of the urinary tract from an enlarged prostate gland. In men and women, it can also be caused by an infection or nerve damage. Or it may be a side effect of a medicine. The doctor may have drained the urine from your bladder. If you still have problems passing urine, you may need to use a catheter at home. This is used to empty your bladder until the problem can be fixed. Your doctor may put a catheter in your bladder before you go home. If so, he or she will tell you when to come back to have the catheter removed. The doctor has checked you closely. But problems can develop later. If you notice any problems or new symptoms, get medical treatment right away. Follow-up care is a key part of your treatment and safety. Be sure to make and go to all appointments, and call your doctor if you are having problems. It's also a good idea to know your test results and keep a list of the medicines you take.   How can you care for yourself at home?  · Take your medicines exactly as prescribed. Call your doctor if you think you are having a problem with your medicine. You will get more details on the specific medicines your doctor prescribes. · Check with your doctor before you use any over-the-counter medicines. Many cold and allergy medicines, for example, can make this problem worse. Make sure your doctor knows all of the medicines, vitamins, supplements, and herbal remedies you take. · Spread out through the day the amount of fluid you drink. Do not drink a lot at bedtime. · Avoid alcohol and caffeine. · If you have been given a catheter, or if one is already in place, follow the instructions you were given. Always wash your hands before and after you handle the catheter. When should you call for help? Call your doctor now or seek immediate medical care if:    · You cannot urinate at all, or it is getting harder to urinate.     · You have symptoms of a urinary tract infection. These may include:  ? Pain or burning when you urinate. ? A frequent need to urinate without being able to pass much urine. ? Pain in the flank, which is just below the rib cage and above the waist on either side of the back. ? Blood in your urine. ? A fever.    Watch closely for changes in your health, and be sure to contact your doctor if:    · You have any problems with your catheter.     · You do not get better as expected. Where can you learn more? Go to http://ifrah-hayden.info/  Enter M244 in the search box to learn more about \"Urinary Retention: Care Instructions. \"  Current as of: August 21, 2019Content Version: 12.4  © 3727-1335 [employer]. Care instructions adapted under license by [employer] (which disclaims liability or warranty for this information).  If you have questions about a medical condition or this instruction, always ask your healthcare professional. Arthur Piper disclaims any warranty or liability for your use of this information.

## 2020-04-17 NOTE — PROGRESS NOTES
Problem: Falls - Risk of  Goal: *Absence of Falls  Description: Document Chato Freeze Fall Risk and appropriate interventions in the flowsheet. Outcome: Progressing Towards Goal  Note: Fall Risk Interventions:            Medication Interventions: Evaluate medications/consider consulting pharmacy    Elimination Interventions: Call light in reach, Patient to call for help with toileting needs, Toileting schedule/hourly rounds              Problem: Patient Education: Go to Patient Education Activity  Goal: Patient/Family Education  Outcome: Progressing Towards Goal     Problem: Nutrition Deficit  Goal: *Optimize nutritional status  Outcome: Progressing Towards Goal     Problem: Discharge Planning  Goal: *Discharge to safe environment  Outcome: Progressing Towards Goal     Problem: Diabetes Self-Management  Goal: *Disease process and treatment process  Description: Define diabetes and identify own type of diabetes; list 3 options for treating diabetes. Outcome: Progressing Towards Goal  Goal: *Incorporating nutritional management into lifestyle  Description: Describe effect of type, amount and timing of food on blood glucose; list 3 methods for planning meals. Outcome: Progressing Towards Goal  Goal: *Incorporating physical activity into lifestyle  Description: State effect of exercise on blood glucose levels. Outcome: Progressing Towards Goal  Goal: *Developing strategies to promote health/change behavior  Description: Define the ABC's of diabetes; identify appropriate screenings, schedule and personal plan for screenings. Outcome: Progressing Towards Goal  Goal: *Using medications safely  Description: State effect of diabetes medications on diabetes; name diabetes medication taking, action and side effects. Outcome: Progressing Towards Goal  Goal: *Monitoring blood glucose, interpreting and using results  Description: Identify recommended blood glucose targets  and personal targets.   Outcome: Progressing Towards Goal  Goal: *Prevention, detection, treatment of acute complications  Description: List symptoms of hyper- and hypoglycemia; describe how to treat low blood sugar and actions for lowering  high blood glucose level. Outcome: Progressing Towards Goal  Goal: *Prevention, detection and treatment of chronic complications  Description: Define the natural course of diabetes and describe the relationship of blood glucose levels to long term complications of diabetes.   Outcome: Progressing Towards Goal  Goal: *Developing strategies to address psychosocial issues  Description: Describe feelings about living with diabetes; identify support needed and support network  Outcome: Progressing Towards Goal  Goal: *Insulin pump training  Outcome: Progressing Towards Goal  Goal: *Sick day guidelines  Outcome: Progressing Towards Goal  Goal: *Patient Specific Goal (EDIT GOAL, INSERT TEXT)  Outcome: Progressing Towards Goal     Problem: Patient Education: Go to Patient Education Activity  Goal: Patient/Family Education  Outcome: Progressing Towards Goal     Problem: Pain  Goal: *Control of Pain  Outcome: Progressing Towards Goal     Problem: Patient Education: Go to Patient Education Activity  Goal: Patient/Family Education  Outcome: Progressing Towards Goal

## 2020-04-17 NOTE — PROGRESS NOTES
Problem: Discharge Planning  Goal: *Discharge to safe environment  Outcome:  Resolved/Met    Home    Noted orders for discharge, no services ordered or indicated. Lilli Paredes RN,ext [id number]. Patient and/or next of kin has been given and has signed the [facility] Outpatient Observation  Notification letter and all questions answered. Copy of this notice given to patient and copy placed on chart. Patient and/or next of kin has been given the Outpatient Observation Information and Notification letter and all questions answered. Care Management Interventions  PCP Verified by CM: Yes(Dr. Gladys Petersen)  Last Visit to PCP: 01/06/20  Mode of Transport at Discharge: Other (see comment)(friend or family)  Transition of Care Consult (CM Consult): Discharge Planning  Discharge Durable Medical Equipment: No  Physical Therapy Consult: No  Occupational Therapy Consult: No  Speech Therapy Consult: No  Current Support Network:  Other(room mate)  Confirm Follow Up Transport: Friends  Discharge Location  Discharge Placement: Home

## 2020-04-17 NOTE — PROGRESS NOTES
1900  -- Bedside, Verbal and Written shift change report given to 2309 Harinder Pathak (oncoming nurse) by Freddy Kussmaul nurse). Allergy band placed on pt's wrist. Report included the following information SBAR, Kardex, Intake/Output, MAR and Recent Results.       2319 -- PM medications administered, pt tolerated with ease, will continue to monitor.     0015 -- Shift reassessment, pt condition unchanged, will continue to monitor.      0400 --  Shift reassessment, pt condition unchanged, will continue to monitor.        0700  -- Bedside, Verbal and Written shift change report given to Kiana by KALPESH (offgoing nurse). Report included the following information SBAR, Kardex, Intake/Output, MAR and Recent Results. Skin assessment completed.

## 2020-04-17 NOTE — DISCHARGE SUMMARY
Internal Medicine Discharge Summary        Patient: Kina Otoole    YOB: 1945    Age:  76 y.o. Admit Date: 4/16/2020    Discharge Date: 4/17/2020    LOS:  LOS: 1 day     Discharge To:  Home    Consults: Urology    Admission Diagnoses: Lower obstructive uropathy [N13.9]  Stage 3 acute kidney injury ([facility] [N17.9]  Acute renal failure (ARF) ([facility] [N17.9]    Discharge Diagnoses:    Problem List as of 4/17/2020 Date Reviewed: 4/16/2020          Codes Class Noted - Resolved    Acute renal failure (ARF) ([facility] ICD-10-CM: N17.9  ICD-9-CM: 584.9  4/17/2020 - Present        Lower obstructive uropathy ICD-10-CM: N13.9  ICD-9-CM: 599.60  4/16/2020 - Present        * (Principal) Stage 3 acute kidney injury ([facility] ICD-10-CM: N17.9  ICD-9-CM: 584.9  4/16/2020 - Present        Hypertensive urgency ICD-10-CM: I16.0  ICD-9-CM: 401.9  4/16/2020 - Present        Enlarged prostate ICD-10-CM: N40.0  ICD-9-CM: 600.00  11/28/2017 - Present        Hypertension associated with diabetes ([facility] ICD-10-CM: E11.59, I10  ICD-9-CM: 250.80, 401.9  11/28/2017 - Present        Perennial allergic rhinitis ICD-10-CM: J30.89  ICD-9-CM: 477.8  1/16/2017 - Present        Insulin dependent type 2 diabetes mellitus ([facility] ICD-10-CM: E11.9, Z79.4  ICD-9-CM: 250.00, V58.67  7/7/2016 - Present        Dyslipidemia associated with type 2 diabetes mellitus ([facility] ICD-10-CM: E11.69, E78.5  ICD-9-CM: 250.80, 272.4  12/3/2015 - Present        CKD (chronic kidney disease) stage 1, GFR 90 ml/min or greater ICD-10-CM: N18.1  ICD-9-CM: 585.1  7/8/2014 - Present        Type 2 diabetes with stage 1 chronic kidney disease GFR>90 (HCC) ICD-10-CM: E11.22, N18.1  ICD-9-CM: 250.40, 585.1  7/8/2014 - Present        Retention of urine, unspecified ICD-10-CM: R33.9  ICD-9-CM: 788.20  5/31/2013 - Present        Hematuria, unspecified ICD-10-CM: R31.9  ICD-9-CM: 599.70  9/17/2012 - Present        Benign prostatic hyperplasia with lower urinary tract symptoms ICD-10-CM: N40.1  ICD-9-CM: 600.01  9/17/2012 - Present              Discharge Condition:  Improved    Procedures: Hinson cath placement         HPI: Nadia Us is a 76 y.o. male who presents to [facility] ER with complaint of Urinary Rentention. Patient states that for approximately the last 11 days that he has been urinating with straining, pain, and difficulty. Patient states that he has positionally maneuver and elevate one leg when urinating which requiring straining and caused a urethral, \"burning\" pain that gradually worsened and became a 10/10 intensity pain. Patient states that the pain was worst when urinating and right before urination, but also would hurt in the region of he suspects is his prostate. Patient states that he had increased instance of urinating with smaller volumes at each instance. Patient states that over the last few weeks he noted blood in his underwear twice that he determined came from his urethra. Patient also complains of predominantly constipation with short-lived episodes of diarrhea, scant nasal drainage with sore throat (which he attributes to expected, recurrent seasonal allergies), and a cough that he normally associated with this phenomena. Patient also remarks that he has been having some dizziness upon standing up from a seated or laying position. Patient denies fevers, nausea, vomiting, and chest pain. Notably, Patient has had previous TURP, Cystoscopy, and his Primary Urologist is Dr. Rosendo Cabrera. Patient reports episodic recurrence of urinary retention that requires placement of a hinson catheter for maybe a week at a time and then removal many times before.     In [facility] ER, Patient was noted to have Cr 2.90 (Baseline 0.08-0.94), Glucose 246 mg/dL, WBC 10.9, and Blood Pressure 178/102 mm Hg. Bladder Scan indicated that Patient's Bladder contained 9,999 mL of Urine. Numerous attempts to place Hinson Catheters and Coudes failed.   [facility] ER Physician [address] Urological services.     Patient is admitted to [facility] Medical Unit (non-Covid-19 Cohort) for Acute Kidney Injury Stage III 2°/2 Obstructive Uropathy, Hypertensive Urgency, and Hyperglycemia. Hospital Course:    Obstructive Uropathy with B/L hydronephrosis - Urology consulted. Parker cath placed and will need to keep in place x 1 week with follow up outpatient for void trial and/or consideration of BPH surgery. Cont flomax and avodart. DANIEL 2/2 Obstruction - Resolved with IV fluids    UTI - Treated with IV rocephin, transitioned to oral keflex for additional 5 days at discharge. The rest of the patient's chronic conditions were managed appropriately during their admission. They were medically stable at the time of discharge. Visit Vitals  /61 (BP 1 Location: Right arm, BP Patient Position: At rest)   Pulse 61   Temp 98.8 °F (37.1 °C)   Resp 20   Ht 5' 9\" (1.753 m)   Wt 86.5 kg (190 lb 12.8 oz)   SpO2 96%   BMI 28.18 kg/m²       Physical Exam at Discharge:  General Appearance: NAD, conversant  HENT: normocephalic/atraumatic, moist mucus membranes  Lungs: CTA with normal respiratory effort  CV: RRR, no m/r/g  Abdomen: soft, non-tender, normal bowel sounds  Extremities: no cyanosis, no peripheral edema  Neuro: moves all extremities, no focal deficits  Psych: appropriate affect, alert and oriented to person, place and time    Labs Prior to Discharge:  Labs: Results:       Chemistry Recent Labs     04/17/20  0500 04/16/20  0400   * 246*    140   K 3.9 3.7    104   CO2 27 23   BUN 34* 44*   CREA 1.20 2.90*   CA 8.1* 8.9   AGAP 5 13   BUCR 28* 15   AP 65 85   TP 5.8* 7.5   ALB 2.8* 3.8   GLOB 3.0 3.7   AGRAT 0.9 1.0      CBC w/Diff Recent Labs     04/17/20  0500 04/16/20  0400   WBC 10.4 10.9   RBC 4.09* 4.78   HGB 12.6* 14.8   HCT 37.8 43.1    192   GRANS 69 81*   LYMPH 18* 10*   EOS 1 0      Cardiac Enzymes No results for input(s): CPK, CKND1, EDITH in the last 72 hours.     No lab exists for component: CKRMB, TROIP   Coagulation No results for input(s): PTP, INR, APTT, INREXT in the last 72 hours. Lipid Panel No results found for: CHOL, CHOLPOCT, CHOLX, CHLST, CHOLV, 432624, HDL, HDLP, LDL, LDLC, DLDLP, 553006, VLDLC, VLDL, TGLX, TRIGL, TRIGP, TGLPOCT, CHHD, CHHDX   BNP No results for input(s): BNPP in the last 72 hours. Liver Enzymes Recent Labs     04/17/20  0500   TP 5.8*   ALB 2.8*   AP 65   SGOT 26      Thyroid Studies Lab Results   Component Value Date/Time    TSH 1.53 11/21/2014 11:15 AM            Significant Imaging:  Ct Abd Pelv Wo Cont    Result Date: 4/16/2020  EXAM: CT of the abdomen and pelvis INDICATION: Renal failure, urinary retention. COMPARISON: 11/17/2007 TECHNIQUE: Axial CT imaging of the abdomen and pelvis was performed without intravenous contrast. Multiplanar reformats were generated. One or more dose reduction techniques were used on this CT: automated exposure control, adjustment of the mAs and/or kVp according to patient size, and iterative reconstruction techniques. The specific techniques used on this CT exam have been documented in the patient's electronic medical record. Digital Imaging and Communications in Medicine (DICOM) format image data are available to nonaffiliated external healthcare facilities or entities on a secure, media free, reciprocally searchable basis with patient authorization for at least a 12-month period after this study. _______________ FINDINGS: LOWER CHEST: Unremarkable. LIVER, BILIARY: Liver is normal. No biliary dilation. Gallbladder is unremarkable. PANCREAS: Normal. SPLEEN: Normal. ADRENALS: Normal. KIDNEYS/URETERS/BLADDER: Moderate bilateral hydroureteronephrosis. No renal calculus. Parker catheter within a decompressed urinary bladder. PELVIC ORGANS: Prostate measures 6.5 cm in transverse diameter. VASCULATURE: Unremarkable LYMPH NODES: No enlarged lymph nodes. GASTROINTESTINAL TRACT: No bowel dilation or wall thickening.  BONES: No acute or aggressive osseous abnormalities identified. OTHER: None. _______________     IMPRESSION: Moderate bilateral hydroureteronephrosis. No renal calculus. Parker catheter within a decompressed urinary bladder. Prostate measures 6.5 cm in transverse diameter. Discharge Medications:     Current Discharge Medication List      START taking these medications    Details   cephALEXin (Keflex) 500 mg capsule Take 1 Cap by mouth four (4) times daily for 5 days. Qty: 20 Cap, Refills: 0         CONTINUE these medications which have NOT CHANGED    Details   dutasteride (AVODART) 0.5 mg capsule TAKE 1 CAPSULE BY MOUTH DAILY  Qty: 90 Cap, Refills: 2    Associated Diagnoses: Benign non-nodular prostatic hyperplasia with lower urinary tract symptoms; Retention of urine, unspecified      tamsulosin (FLOMAX) 0.4 mg capsule TAKE TWO CAPSULES BY MOUTH DAILY AFTER DINNER  Qty: 180 Cap, Refills: 2    Associated Diagnoses: Benign non-nodular prostatic hyperplasia with lower urinary tract symptoms; Retention of urine      atorvastatin (LIPITOR) 20 mg tablet Refills: 3    Associated Diagnoses: Benign prostatic hyperplasia, unspecified whether lower urinary tract symptoms present      amLODIPine (NORVASC) 10 mg tablet TAKE 1 TABLET BY MOUTH ONCE A DAY    Associated Diagnoses: Benign non-nodular prostatic hyperplasia with lower urinary tract symptoms; Retention of urine, unspecified      benazepril (LOTENSIN) 40 mg tablet TAKE ONE TABLET BY MOUTH ONCE A DAY    Associated Diagnoses: Benign non-nodular prostatic hyperplasia with lower urinary tract symptoms; Retention of urine, unspecified      glipiZIDE (GLUCOTROL) 10 mg tablet 10 mg. Associated Diagnoses: Benign non-nodular prostatic hyperplasia with lower urinary tract symptoms; Retention of urine, unspecified      hydroCHLOROthiazide (HYDRODIURIL) 25 mg tablet TAKE 1 TABLET BY MOUTH ONCE A DAY.     Associated Diagnoses: Benign non-nodular prostatic hyperplasia with lower urinary tract symptoms; Retention of urine, unspecified      metFORMIN (GLUCOPHAGE) 850 mg tablet TAKE ONE TABLET BY MOUTH TWICE DAILY WITH MEALS    Associated Diagnoses: Benign non-nodular prostatic hyperplasia with lower urinary tract symptoms; Retention of urine, unspecified      nebivolol (BYSTOLIC) 10 mg tablet TAKE 2 TABLETS BY MOUTH ONCE A DAY. Associated Diagnoses: Benign non-nodular prostatic hyperplasia with lower urinary tract symptoms; Retention of urine, unspecified      mometasone (NASONEX) 50 mcg/actuation nasal spray 2 Sprays daily. omeprazole (PRILOSEC) 10 mg capsule Take 10 mg by mouth as needed. DOCOSAHEXANOIC ACID/EPA (FISH OIL PO) Take 1 Tab by mouth daily. 1200 mg with 360 mg omega-3      aspirin 81 mg tablet Take 81 mg by mouth daily. Activity: Activity as tolerated    Diet: Resume previous diet    Wound Care: None needed    Follow-up:   Please follow up with your PCP within 7 days to discuss your recent hospitalization. Patient to arrange.   Urology in 1 week         Total time spent including time spent on final examination and discharge discussion, discharge documentation and records reviewed and medication reconciliation: > 30 minutes    Paco Shore DO  Internal Medicine, Hospitalist  Pager: [phone]

## 2020-04-17 NOTE — PHYSICIAN ADVISORY
Letter of admission status determination     Kendall Michelle   Age: 76 y.o. MRN: [id number]  [facility]:  [id number]    Date of hospitalization: 4/16/2020    I have reviewed this case as it involves a Medicare patient admitted as inpatient and hospitalized for less than two midnights. The available documentation of patient's condition and plan of care at the time of admission, does not support medically necessary hospitalization for two or more midnights. The patient is stable for discharge on day 2. Therefore, since at the time of admission there was no clear expectation of a hospital stay spanning at least two midnights, I recommend changing the hospitalization status to Outpatient Observation. The final decision regarding the patient's hospitalization status depends on the attending physician's judgment.       Carter Hazel MD, [facility], [address] [place] [address] DEPT. [facility], [address] [place], [place] [phone]

## 2020-04-18 ENCOUNTER — PATIENT OUTREACH (OUTPATIENT)
Dept: CASE MANAGEMENT | Age: 75
End: 2020-04-18

## 2020-04-18 NOTE — PROGRESS NOTES
4/18/2020 9:25 AM     CTN attempted to contact patient for COVID Risk. Patient admitted to [facility] on 4/16/2020 and discharged on 4/17/2020 for Stage 3 acute kidney injury. Message left introducing myself, the purpose of the call and giving my contact information. Requested that patient call back at his earliest convenience.

## 2020-04-20 ENCOUNTER — PATIENT OUTREACH (OUTPATIENT)
Dept: CASE MANAGEMENT | Age: 75
End: 2020-04-20

## 2020-04-20 LAB
BACTERIA SPEC CULT: ABNORMAL
CC UR VC: ABNORMAL
SERVICE CMNT-IMP: ABNORMAL

## 2020-04-20 NOTE — PROGRESS NOTES
Patient contacted regarding recent discharge and COVID-19 risk   Care Transition Nurse/ Ambulatory Care Manager contacted the patient by telephone to perform post discharge assessment. Verified name and  with patient as identifiers. Patient has following risk factors of: chronic kidney disease. CTN/ACM reviewed discharge instructions, medical action plan and red flags related to discharge diagnosis. Reviewed and educated them on any new and changed medications related to discharge diagnosis. Advised obtaining a 90-day supply of all daily and as-needed medications. Education provided regarding infection prevention, and signs and symptoms of COVID-19 and when to seek medical attention with patient who verbalized understanding. Discussed exposure protocols and quarantine from [address] you at higher risk for severe illness  and given an opportunity for questions and concerns. The patient agrees to contact the COVID-19 hotline [phone] or PCP office for questions related to their healthcare. CTN/ACM provided contact information for future reference. From CDC: Are you at higher risk for severe illness?  Wash your hands often.  Avoid close contact (6 feet, which is about two arm lengths) with people who are sick.  Put distance between yourself and other people if COVID-19 is spreading in your community.  Clean and disinfect frequently touched surfaces.  Avoid all cruise travel and non-essential air travel.  Call your healthcare professional if you have concerns about COVID-19 and your underlying condition or if you are sick.     For more information on steps you can take to protect yourself, see CDC's How to Protect Yourself      Patient/family/caregiver given information for [facility] and agrees to enroll yes  Patient's preferred e-mail:  [email or web address]  Patient's preferred phone number: [phone]  Based on Loop alert triggers, patient will be contacted by nurse care manager for worsening symptoms. Patient expressed that he is feeling so much better than when he went into the hospital. He appreciated the call to check on him. Plan for follow-up call in 7-14 days based on severity of symptoms and risk factors.

## 2020-05-03 ENCOUNTER — PATIENT OUTREACH (OUTPATIENT)
Dept: CASE MANAGEMENT | Age: 75
End: 2020-05-03

## 2020-05-03 NOTE — PROGRESS NOTES
Patient resolved from Transition of Care episode on 5/3/2020  Patient/family has been provided the following resources and education related to COVID-19:                         Signs, symptoms and red flags related to COVID-19            CDC exposure and quarantine guidelines            [facility] exposure contact - [phone]            Contact for their local Department of Health                 Patient currently reports that the following symptoms have improved:  kidney issues. He is still being followed by urology. Patient appreciated the call to check on him. No further outreach scheduled with this CTN/ACM. Episode of Care resolved. Patient has this CTN/ACM contact information if future needs arise.

## 2021-08-03 PROBLEM — N17.9 ACUTE RENAL FAILURE (ARF) (HCC): Status: RESOLVED | Noted: 2020-04-17 | Resolved: 2021-08-03

## 2025-02-12 NOTE — PROGRESS NOTES
Problem: Discharge Planning  Goal: *Discharge to safe environment  Outcome: Progressing Towards Goal     Plan:  home [Initial Evaluation] : an initial evaluation for [Ear Pain] : ear pain [FreeTextEntry2] : otalgia